# Patient Record
Sex: FEMALE | Race: WHITE | NOT HISPANIC OR LATINO | Employment: PART TIME | URBAN - METROPOLITAN AREA
[De-identification: names, ages, dates, MRNs, and addresses within clinical notes are randomized per-mention and may not be internally consistent; named-entity substitution may affect disease eponyms.]

---

## 2022-04-07 ENCOUNTER — HISTORICAL (OUTPATIENT)
Dept: ADMINISTRATIVE | Facility: HOSPITAL | Age: 52
End: 2022-04-07

## 2022-04-20 ENCOUNTER — HISTORICAL (OUTPATIENT)
Dept: SURGERY | Facility: HOSPITAL | Age: 52
End: 2022-04-20

## 2022-04-20 LAB — CBG: 111 (ref 70–115)

## 2022-04-24 VITALS
WEIGHT: 293 LBS | HEIGHT: 67 IN | DIASTOLIC BLOOD PRESSURE: 80 MMHG | BODY MASS INDEX: 45.99 KG/M2 | SYSTOLIC BLOOD PRESSURE: 200 MMHG

## 2022-05-26 ENCOUNTER — LAB REQUISITION (OUTPATIENT)
Dept: LAB | Facility: HOSPITAL | Age: 52
End: 2022-05-26
Payer: MEDICAID

## 2022-05-26 DIAGNOSIS — E11.9 TYPE 2 DIABETES MELLITUS WITHOUT COMPLICATIONS: ICD-10-CM

## 2022-05-26 DIAGNOSIS — N18.5 CHRONIC KIDNEY DISEASE, STAGE 5: ICD-10-CM

## 2022-05-26 DIAGNOSIS — E78.49 OTHER HYPERLIPIDEMIA: ICD-10-CM

## 2022-05-26 LAB
ALBUMIN SERPL-MCNC: 2.3 GM/DL (ref 3.5–5)
ALBUMIN/GLOB SERPL: 0.7 RATIO (ref 1.1–2)
ALP SERPL-CCNC: 89 UNIT/L (ref 40–150)
ALT SERPL-CCNC: <5 UNIT/L (ref 0–55)
AST SERPL-CCNC: 10 UNIT/L (ref 5–34)
BASOPHILS # BLD AUTO: 0.05 X10(3)/MCL (ref 0–0.2)
BASOPHILS NFR BLD AUTO: 0.8 %
BILIRUBIN DIRECT+TOT PNL SERPL-MCNC: 0.2 MG/DL (ref 0–0.5)
BILIRUBIN DIRECT+TOT PNL SERPL-MCNC: 0.6 MG/DL
BUN SERPL-MCNC: 26 MG/DL (ref 9.8–20.1)
CALCIUM SERPL-MCNC: 8.5 MG/DL (ref 8.4–10.2)
CHLORIDE SERPL-SCNC: 105 MMOL/L (ref 98–107)
CHOLEST SERPL-MCNC: 125 MG/DL
CHOLEST/HDLC SERPL: 4 {RATIO} (ref 0–5)
CO2 SERPL-SCNC: 28 MMOL/L (ref 22–29)
CREAT SERPL-MCNC: 3.4 MG/DL (ref 0.55–1.02)
EOSINOPHIL # BLD AUTO: 0.28 X10(3)/MCL (ref 0–0.9)
EOSINOPHIL NFR BLD AUTO: 4.4 %
ERYTHROCYTE [DISTWIDTH] IN BLOOD BY AUTOMATED COUNT: 14.5 % (ref 11.5–17)
EST. AVERAGE GLUCOSE BLD GHB EST-MCNC: 131.2 MG/DL
GLOBULIN SER-MCNC: 3.2 GM/DL (ref 2.4–3.5)
GLUCOSE SERPL-MCNC: 146 MG/DL (ref 74–100)
HBA1C MFR BLD: 6.2 %
HCT VFR BLD AUTO: 26.6 % (ref 37–47)
HDLC SERPL-MCNC: 29 MG/DL (ref 35–60)
HGB BLD-MCNC: 8.1 GM/DL (ref 12–16)
IMM GRANULOCYTES # BLD AUTO: 0.14 X10(3)/MCL (ref 0–0.02)
IMM GRANULOCYTES NFR BLD AUTO: 2.2 % (ref 0–0.43)
LDLC SERPL CALC-MCNC: 67 MG/DL (ref 50–140)
LYMPHOCYTES # BLD AUTO: 1.76 X10(3)/MCL (ref 0.6–4.6)
LYMPHOCYTES NFR BLD AUTO: 27.9 %
MCH RBC QN AUTO: 29 PG (ref 27–31)
MCHC RBC AUTO-ENTMCNC: 30.5 MG/DL (ref 33–36)
MCV RBC AUTO: 95.3 FL (ref 80–94)
MONOCYTES # BLD AUTO: 0.66 X10(3)/MCL (ref 0.1–1.3)
MONOCYTES NFR BLD AUTO: 10.5 %
NEUTROPHILS # BLD AUTO: 3.4 X10(3)/MCL (ref 2.1–9.2)
NEUTROPHILS NFR BLD AUTO: 54.2 %
NRBC BLD AUTO-RTO: 0 %
PLATELET # BLD AUTO: 262 X10(3)/MCL (ref 130–400)
PMV BLD AUTO: 10.9 FL (ref 9.4–12.4)
POTASSIUM SERPL-SCNC: 4 MMOL/L (ref 3.5–5.1)
PREALB SERPL-MCNC: 14.2 MG/DL (ref 16–38)
PROT SERPL-MCNC: 5.5 GM/DL (ref 6.4–8.3)
RBC # BLD AUTO: 2.79 X10(6)/MCL (ref 4.2–5.4)
SODIUM SERPL-SCNC: 144 MMOL/L (ref 136–145)
TRIGL SERPL-MCNC: 144 MG/DL (ref 37–140)
VIT B12 SERPL-MCNC: 1258 PG/ML (ref 213–816)
VLDLC SERPL CALC-MCNC: 29 MG/DL
WBC # SPEC AUTO: 6.3 X10(3)/MCL (ref 4.5–11.5)

## 2022-05-26 PROCEDURE — 83036 HEMOGLOBIN GLYCOSYLATED A1C: CPT | Performed by: NURSE PRACTITIONER

## 2022-05-26 PROCEDURE — 85025 COMPLETE CBC W/AUTO DIFF WBC: CPT | Performed by: NURSE PRACTITIONER

## 2022-05-26 PROCEDURE — 80061 LIPID PANEL: CPT | Performed by: NURSE PRACTITIONER

## 2022-05-26 PROCEDURE — 82607 VITAMIN B-12: CPT | Performed by: NURSE PRACTITIONER

## 2022-05-26 PROCEDURE — 82248 BILIRUBIN DIRECT: CPT | Performed by: NURSE PRACTITIONER

## 2022-05-26 PROCEDURE — 84134 ASSAY OF PREALBUMIN: CPT | Performed by: NURSE PRACTITIONER

## 2022-05-26 PROCEDURE — 80053 COMPREHEN METABOLIC PANEL: CPT | Performed by: NURSE PRACTITIONER

## 2022-05-26 PROCEDURE — 80177 DRUG SCRN QUAN LEVETIRACETAM: CPT | Performed by: NURSE PRACTITIONER

## 2022-05-27 LAB — LEVETIRACETAM SERPL-MCNC: 28.2 MCG/ML (ref 10–40)

## 2022-06-02 ENCOUNTER — LAB REQUISITION (OUTPATIENT)
Dept: LAB | Facility: HOSPITAL | Age: 52
End: 2022-06-02
Payer: MEDICAID

## 2022-06-02 DIAGNOSIS — E11.9 TYPE 2 DIABETES MELLITUS WITHOUT COMPLICATIONS: ICD-10-CM

## 2022-06-02 DIAGNOSIS — E78.49 OTHER HYPERLIPIDEMIA: ICD-10-CM

## 2022-06-02 LAB
ALBUMIN SERPL-MCNC: 2.7 GM/DL (ref 3.5–5)
ALBUMIN/GLOB SERPL: 0.9 RATIO (ref 1.1–2)
ALP SERPL-CCNC: 130 UNIT/L (ref 40–150)
ALT SERPL-CCNC: 5 UNIT/L (ref 0–55)
AST SERPL-CCNC: 12 UNIT/L (ref 5–34)
BASOPHILS # BLD AUTO: 0.07 X10(3)/MCL (ref 0–0.2)
BASOPHILS NFR BLD AUTO: 1.5 %
BILIRUBIN DIRECT+TOT PNL SERPL-MCNC: 1 MG/DL
BUN SERPL-MCNC: 12.6 MG/DL (ref 9.8–20.1)
CALCIUM SERPL-MCNC: 8.6 MG/DL (ref 8.4–10.2)
CHLORIDE SERPL-SCNC: 97 MMOL/L (ref 98–107)
CO2 SERPL-SCNC: 35 MMOL/L (ref 22–29)
CREAT SERPL-MCNC: 2.77 MG/DL (ref 0.55–1.02)
EOSINOPHIL # BLD AUTO: 0.21 X10(3)/MCL (ref 0–0.9)
EOSINOPHIL NFR BLD AUTO: 4.6 %
ERYTHROCYTE [DISTWIDTH] IN BLOOD BY AUTOMATED COUNT: 14.2 % (ref 11.5–17)
GLOBULIN SER-MCNC: 3.1 GM/DL (ref 2.4–3.5)
GLUCOSE SERPL-MCNC: 135 MG/DL (ref 74–100)
HCT VFR BLD AUTO: 30 % (ref 37–47)
HGB BLD-MCNC: 8.9 GM/DL (ref 12–16)
HYPOCHROMIA BLD QL SMEAR: ABNORMAL
IMM GRANULOCYTES # BLD AUTO: 0.01 X10(3)/MCL (ref 0–0.02)
IMM GRANULOCYTES NFR BLD AUTO: 0.2 % (ref 0–0.43)
LYMPHOCYTES # BLD AUTO: 1.76 X10(3)/MCL (ref 0.6–4.6)
LYMPHOCYTES NFR BLD AUTO: 38.8 %
MCH RBC QN AUTO: 28.7 PG (ref 27–31)
MCHC RBC AUTO-ENTMCNC: 29.7 MG/DL (ref 33–36)
MCV RBC AUTO: 96.8 FL (ref 80–94)
MONOCYTES # BLD AUTO: 0.56 X10(3)/MCL (ref 0.1–1.3)
MONOCYTES NFR BLD AUTO: 12.3 %
NEUTROPHILS # BLD AUTO: 1.9 X10(3)/MCL (ref 2.1–9.2)
NEUTROPHILS NFR BLD AUTO: 42.6 %
NRBC BLD AUTO-RTO: 0 %
PLATELET # BLD AUTO: 151 X10(3)/MCL (ref 130–400)
PLATELET # BLD EST: ADEQUATE 10*3/UL
PMV BLD AUTO: 11.4 FL (ref 9.4–12.4)
POTASSIUM SERPL-SCNC: 4 MMOL/L (ref 3.5–5.1)
PROT SERPL-MCNC: 5.8 GM/DL (ref 6.4–8.3)
RBC # BLD AUTO: 3.1 X10(6)/MCL (ref 4.2–5.4)
RBC MORPH BLD: ABNORMAL
SODIUM SERPL-SCNC: 141 MMOL/L (ref 136–145)
TSH SERPL-ACNC: 4.49 UIU/ML (ref 0.35–4.94)
WBC # SPEC AUTO: 4.5 X10(3)/MCL (ref 4.5–11.5)

## 2022-06-02 PROCEDURE — 84443 ASSAY THYROID STIM HORMONE: CPT | Performed by: NURSE PRACTITIONER

## 2022-06-02 PROCEDURE — 80053 COMPREHEN METABOLIC PANEL: CPT | Performed by: NURSE PRACTITIONER

## 2022-06-02 PROCEDURE — 85025 COMPLETE CBC W/AUTO DIFF WBC: CPT | Performed by: NURSE PRACTITIONER

## 2022-06-09 ENCOUNTER — LAB REQUISITION (OUTPATIENT)
Dept: LAB | Facility: HOSPITAL | Age: 52
End: 2022-06-09
Payer: MEDICAID

## 2022-06-09 DIAGNOSIS — E11.9 TYPE 2 DIABETES MELLITUS WITHOUT COMPLICATIONS: ICD-10-CM

## 2022-06-09 DIAGNOSIS — E78.49 OTHER HYPERLIPIDEMIA: ICD-10-CM

## 2022-06-09 LAB
ALBUMIN SERPL-MCNC: 2.8 GM/DL (ref 3.5–5)
ALBUMIN/GLOB SERPL: 0.9 RATIO (ref 1.1–2)
ALP SERPL-CCNC: 122 UNIT/L (ref 40–150)
ALT SERPL-CCNC: 9 UNIT/L (ref 0–55)
AST SERPL-CCNC: 12 UNIT/L (ref 5–34)
BASOPHILS # BLD AUTO: 0.05 X10(3)/MCL (ref 0–0.2)
BASOPHILS NFR BLD AUTO: 1.1 %
BILIRUBIN DIRECT+TOT PNL SERPL-MCNC: 0.8 MG/DL
BUN SERPL-MCNC: 9.3 MG/DL (ref 9.8–20.1)
CALCIUM SERPL-MCNC: 8.6 MG/DL (ref 8.4–10.2)
CHLORIDE SERPL-SCNC: 98 MMOL/L (ref 98–107)
CO2 SERPL-SCNC: 32 MMOL/L (ref 22–29)
CREAT SERPL-MCNC: 2.32 MG/DL (ref 0.55–1.02)
EOSINOPHIL # BLD AUTO: 0.29 X10(3)/MCL (ref 0–0.9)
EOSINOPHIL NFR BLD AUTO: 6.3 %
ERYTHROCYTE [DISTWIDTH] IN BLOOD BY AUTOMATED COUNT: 14.8 % (ref 11.5–17)
GLOBULIN SER-MCNC: 3 GM/DL (ref 2.4–3.5)
GLUCOSE SERPL-MCNC: 190 MG/DL (ref 74–100)
HCT VFR BLD AUTO: 26.8 % (ref 37–47)
HGB BLD-MCNC: 7.9 GM/DL (ref 12–16)
IMM GRANULOCYTES # BLD AUTO: 0.01 X10(3)/MCL (ref 0–0.02)
IMM GRANULOCYTES NFR BLD AUTO: 0.2 % (ref 0–0.43)
LYMPHOCYTES # BLD AUTO: 1.78 X10(3)/MCL (ref 0.6–4.6)
LYMPHOCYTES NFR BLD AUTO: 38.9 %
MCH RBC QN AUTO: 28.3 PG (ref 27–31)
MCHC RBC AUTO-ENTMCNC: 29.5 MG/DL (ref 33–36)
MCV RBC AUTO: 96.1 FL (ref 80–94)
MONOCYTES # BLD AUTO: 0.4 X10(3)/MCL (ref 0.1–1.3)
MONOCYTES NFR BLD AUTO: 8.7 %
NEUTROPHILS # BLD AUTO: 2.1 X10(3)/MCL (ref 2.1–9.2)
NEUTROPHILS NFR BLD AUTO: 44.8 %
NRBC BLD AUTO-RTO: 0 %
PLATELET # BLD AUTO: 168 X10(3)/MCL (ref 130–400)
PMV BLD AUTO: 11 FL (ref 9.4–12.4)
POTASSIUM SERPL-SCNC: 4.3 MMOL/L (ref 3.5–5.1)
PROT SERPL-MCNC: 5.8 GM/DL (ref 6.4–8.3)
RBC # BLD AUTO: 2.79 X10(6)/MCL (ref 4.2–5.4)
SODIUM SERPL-SCNC: 140 MMOL/L (ref 136–145)
WBC # SPEC AUTO: 4.6 X10(3)/MCL (ref 4.5–11.5)

## 2022-06-09 PROCEDURE — 80053 COMPREHEN METABOLIC PANEL: CPT | Performed by: NURSE PRACTITIONER

## 2022-06-09 PROCEDURE — 85025 COMPLETE CBC W/AUTO DIFF WBC: CPT | Performed by: NURSE PRACTITIONER

## 2022-06-16 ENCOUNTER — LAB REQUISITION (OUTPATIENT)
Dept: LAB | Facility: HOSPITAL | Age: 52
End: 2022-06-16
Payer: MEDICAID

## 2022-06-16 DIAGNOSIS — E78.49 OTHER HYPERLIPIDEMIA: ICD-10-CM

## 2022-06-16 DIAGNOSIS — E11.9 TYPE 2 DIABETES MELLITUS WITHOUT COMPLICATIONS: ICD-10-CM

## 2022-06-16 LAB
ALBUMIN SERPL-MCNC: 2.8 GM/DL (ref 3.5–5)
ALBUMIN/GLOB SERPL: 1.1 RATIO (ref 1.1–2)
ALP SERPL-CCNC: 100 UNIT/L (ref 40–150)
ALT SERPL-CCNC: 6 UNIT/L (ref 0–55)
ANISOCYTOSIS BLD QL SMEAR: ABNORMAL
AST SERPL-CCNC: 11 UNIT/L (ref 5–34)
BASOPHILS # BLD AUTO: 0.03 X10(3)/MCL (ref 0–0.2)
BASOPHILS NFR BLD AUTO: 0.6 %
BILIRUBIN DIRECT+TOT PNL SERPL-MCNC: 0.9 MG/DL
BUN SERPL-MCNC: 6.2 MG/DL (ref 9.8–20.1)
CALCIUM SERPL-MCNC: 8.4 MG/DL (ref 8.4–10.2)
CHLORIDE SERPL-SCNC: 102 MMOL/L (ref 98–107)
CO2 SERPL-SCNC: 30 MMOL/L (ref 22–29)
CREAT SERPL-MCNC: 2.13 MG/DL (ref 0.55–1.02)
EOSINOPHIL # BLD AUTO: 0.19 X10(3)/MCL (ref 0–0.9)
EOSINOPHIL NFR BLD AUTO: 4.1 %
ERYTHROCYTE [DISTWIDTH] IN BLOOD BY AUTOMATED COUNT: 16.4 % (ref 11.5–17)
GLOBULIN SER-MCNC: 2.6 GM/DL (ref 2.4–3.5)
GLUCOSE SERPL-MCNC: 145 MG/DL (ref 74–100)
HCT VFR BLD AUTO: 25.5 % (ref 37–47)
HGB BLD-MCNC: 7.5 GM/DL (ref 12–16)
HYPOCHROMIA BLD QL SMEAR: ABNORMAL
IMM GRANULOCYTES # BLD AUTO: 0.01 X10(3)/MCL (ref 0–0.02)
IMM GRANULOCYTES NFR BLD AUTO: 0.2 % (ref 0–0.43)
LYMPHOCYTES # BLD AUTO: 1.77 X10(3)/MCL (ref 0.6–4.6)
LYMPHOCYTES NFR BLD AUTO: 37.7 %
MACROCYTES BLD QL SMEAR: SLIGHT
MCH RBC QN AUTO: 29.3 PG (ref 27–31)
MCHC RBC AUTO-ENTMCNC: 29.4 MG/DL (ref 33–36)
MCV RBC AUTO: 99.6 FL (ref 80–94)
MICROCYTES BLD QL SMEAR: SLIGHT
MONOCYTES # BLD AUTO: 0.36 X10(3)/MCL (ref 0.1–1.3)
MONOCYTES NFR BLD AUTO: 7.7 %
NEUTROPHILS # BLD AUTO: 2.3 X10(3)/MCL (ref 2.1–9.2)
NEUTROPHILS NFR BLD AUTO: 49.7 %
NRBC BLD AUTO-RTO: 0 %
PLATELET # BLD AUTO: 214 X10(3)/MCL (ref 130–400)
PLATELET # BLD EST: ADEQUATE 10*3/UL
PMV BLD AUTO: 10.7 FL (ref 9.4–12.4)
POTASSIUM SERPL-SCNC: 3.8 MMOL/L (ref 3.5–5.1)
PROT SERPL-MCNC: 5.4 GM/DL (ref 6.4–8.3)
RBC # BLD AUTO: 2.56 X10(6)/MCL (ref 4.2–5.4)
RBC MORPH BLD: ABNORMAL
SODIUM SERPL-SCNC: 142 MMOL/L (ref 136–145)
WBC # SPEC AUTO: 4.7 X10(3)/MCL (ref 4.5–11.5)

## 2022-06-16 PROCEDURE — 80053 COMPREHEN METABOLIC PANEL: CPT | Performed by: NURSE PRACTITIONER

## 2022-06-16 PROCEDURE — 85025 COMPLETE CBC W/AUTO DIFF WBC: CPT | Performed by: NURSE PRACTITIONER

## 2022-06-30 ENCOUNTER — LAB REQUISITION (OUTPATIENT)
Dept: LAB | Facility: HOSPITAL | Age: 52
End: 2022-06-30
Payer: MEDICAID

## 2022-06-30 DIAGNOSIS — D64.9 ANEMIA, UNSPECIFIED: ICD-10-CM

## 2022-06-30 DIAGNOSIS — E56.8 DEFICIENCY OF OTHER VITAMINS: ICD-10-CM

## 2022-06-30 DIAGNOSIS — N18.6 END STAGE RENAL DISEASE: ICD-10-CM

## 2022-06-30 LAB
ALBUMIN SERPL-MCNC: 2.7 GM/DL (ref 3.5–5)
ALBUMIN/GLOB SERPL: 1 RATIO (ref 1.1–2)
ALP SERPL-CCNC: 77 UNIT/L (ref 40–150)
ALT SERPL-CCNC: 6 UNIT/L (ref 0–55)
AST SERPL-CCNC: 8 UNIT/L (ref 5–34)
BASOPHILS # BLD AUTO: 0.03 X10(3)/MCL (ref 0–0.2)
BASOPHILS NFR BLD AUTO: 0.6 %
BILIRUBIN DIRECT+TOT PNL SERPL-MCNC: 0.8 MG/DL
BUN SERPL-MCNC: 10.4 MG/DL (ref 9.8–20.1)
CALCIUM SERPL-MCNC: 8.9 MG/DL (ref 8.4–10.2)
CHLORIDE SERPL-SCNC: 102 MMOL/L (ref 98–107)
CO2 SERPL-SCNC: 29 MMOL/L (ref 22–29)
CREAT SERPL-MCNC: 2.42 MG/DL (ref 0.55–1.02)
DEPRECATED CALCIDIOL+CALCIFEROL SERPL-MC: 10.4 NG/ML (ref 30–80)
EOSINOPHIL # BLD AUTO: 0.15 X10(3)/MCL (ref 0–0.9)
EOSINOPHIL NFR BLD AUTO: 3.1 %
ERYTHROCYTE [DISTWIDTH] IN BLOOD BY AUTOMATED COUNT: 16.9 % (ref 11.5–17)
GLOBULIN SER-MCNC: 2.7 GM/DL (ref 2.4–3.5)
GLUCOSE SERPL-MCNC: 220 MG/DL (ref 74–100)
HCT VFR BLD AUTO: 26.4 % (ref 37–47)
HGB BLD-MCNC: 8.2 GM/DL (ref 12–16)
IMM GRANULOCYTES # BLD AUTO: 0.02 X10(3)/MCL (ref 0–0.02)
IMM GRANULOCYTES NFR BLD AUTO: 0.4 % (ref 0–0.43)
LYMPHOCYTES # BLD AUTO: 1.73 X10(3)/MCL (ref 0.6–4.6)
LYMPHOCYTES NFR BLD AUTO: 35.8 %
MCH RBC QN AUTO: 29.9 PG (ref 27–31)
MCHC RBC AUTO-ENTMCNC: 31.1 MG/DL (ref 33–36)
MCV RBC AUTO: 96.4 FL (ref 80–94)
MONOCYTES # BLD AUTO: 0.37 X10(3)/MCL (ref 0.1–1.3)
MONOCYTES NFR BLD AUTO: 7.7 %
NEUTROPHILS # BLD AUTO: 2.5 X10(3)/MCL (ref 2.1–9.2)
NEUTROPHILS NFR BLD AUTO: 52.4 %
NRBC BLD AUTO-RTO: 0 %
PLATELET # BLD AUTO: 223 X10(3)/MCL (ref 130–400)
PMV BLD AUTO: 10.3 FL (ref 7.4–10.4)
POTASSIUM SERPL-SCNC: 4.3 MMOL/L (ref 3.5–5.1)
PROT SERPL-MCNC: 5.4 GM/DL (ref 6.4–8.3)
RBC # BLD AUTO: 2.74 X10(6)/MCL (ref 4.2–5.4)
SODIUM SERPL-SCNC: 140 MMOL/L (ref 136–145)
WBC # SPEC AUTO: 4.8 X10(3)/MCL (ref 4.5–11.5)

## 2022-06-30 PROCEDURE — 80053 COMPREHEN METABOLIC PANEL: CPT | Performed by: NURSE PRACTITIONER

## 2022-06-30 PROCEDURE — 82306 VITAMIN D 25 HYDROXY: CPT | Performed by: NURSE PRACTITIONER

## 2022-06-30 PROCEDURE — 85025 COMPLETE CBC W/AUTO DIFF WBC: CPT | Performed by: NURSE PRACTITIONER

## 2022-07-18 ENCOUNTER — LAB REQUISITION (OUTPATIENT)
Dept: LAB | Facility: HOSPITAL | Age: 52
End: 2022-07-18
Payer: MEDICAID

## 2022-07-18 DIAGNOSIS — N18.4 CHRONIC KIDNEY DISEASE, STAGE 4 (SEVERE): ICD-10-CM

## 2022-07-18 DIAGNOSIS — D64.9 ANEMIA, UNSPECIFIED: ICD-10-CM

## 2022-07-18 LAB
ALBUMIN SERPL-MCNC: 2.9 GM/DL (ref 3.5–5)
ALBUMIN/GLOB SERPL: 0.9 RATIO (ref 1.1–2)
ALP SERPL-CCNC: 86 UNIT/L (ref 40–150)
ALT SERPL-CCNC: 7 UNIT/L (ref 0–55)
AST SERPL-CCNC: 8 UNIT/L (ref 5–34)
BASOPHILS # BLD AUTO: 0.04 X10(3)/MCL (ref 0–0.2)
BASOPHILS NFR BLD AUTO: 0.7 %
BILIRUBIN DIRECT+TOT PNL SERPL-MCNC: 0.7 MG/DL
BUN SERPL-MCNC: 29.1 MG/DL (ref 9.8–20.1)
CALCIUM SERPL-MCNC: 8.8 MG/DL (ref 8.4–10.2)
CHLORIDE SERPL-SCNC: 104 MMOL/L (ref 98–107)
CO2 SERPL-SCNC: 25 MMOL/L (ref 22–29)
CREAT SERPL-MCNC: 4.34 MG/DL (ref 0.55–1.02)
EOSINOPHIL # BLD AUTO: 0.19 X10(3)/MCL (ref 0–0.9)
EOSINOPHIL NFR BLD AUTO: 3.3 %
ERYTHROCYTE [DISTWIDTH] IN BLOOD BY AUTOMATED COUNT: 15.8 % (ref 11.5–17)
GLOBULIN SER-MCNC: 3.1 GM/DL (ref 2.4–3.5)
GLUCOSE SERPL-MCNC: 166 MG/DL (ref 74–100)
HCT VFR BLD AUTO: 30.8 % (ref 37–47)
HGB BLD-MCNC: 9.3 GM/DL (ref 12–16)
IMM GRANULOCYTES # BLD AUTO: 0.02 X10(3)/MCL (ref 0–0.04)
IMM GRANULOCYTES NFR BLD AUTO: 0.3 %
LYMPHOCYTES # BLD AUTO: 2.27 X10(3)/MCL (ref 0.6–4.6)
LYMPHOCYTES NFR BLD AUTO: 39.7 %
MCH RBC QN AUTO: 29.9 PG (ref 27–31)
MCHC RBC AUTO-ENTMCNC: 30.2 MG/DL (ref 33–36)
MCV RBC AUTO: 99 FL (ref 80–94)
MONOCYTES # BLD AUTO: 0.39 X10(3)/MCL (ref 0.1–1.3)
MONOCYTES NFR BLD AUTO: 6.8 %
NEUTROPHILS # BLD AUTO: 2.8 X10(3)/MCL (ref 2.1–9.2)
NEUTROPHILS NFR BLD AUTO: 49.2 %
NRBC BLD AUTO-RTO: 0 %
PLATELET # BLD AUTO: 222 X10(3)/MCL (ref 130–400)
PMV BLD AUTO: 10.4 FL (ref 7.4–10.4)
POTASSIUM SERPL-SCNC: 4.5 MMOL/L (ref 3.5–5.1)
PROT SERPL-MCNC: 6 GM/DL (ref 6.4–8.3)
RBC # BLD AUTO: 3.11 X10(6)/MCL (ref 4.2–5.4)
SODIUM SERPL-SCNC: 142 MMOL/L (ref 136–145)
WBC # SPEC AUTO: 5.7 X10(3)/MCL (ref 4.5–11.5)

## 2022-07-18 PROCEDURE — 80053 COMPREHEN METABOLIC PANEL: CPT | Performed by: NURSE PRACTITIONER

## 2022-07-18 PROCEDURE — 85025 COMPLETE CBC W/AUTO DIFF WBC: CPT | Performed by: NURSE PRACTITIONER

## 2022-08-03 ENCOUNTER — LAB REQUISITION (OUTPATIENT)
Dept: LAB | Facility: HOSPITAL | Age: 52
End: 2022-08-03
Payer: MEDICAID

## 2022-08-03 DIAGNOSIS — A49.9 BACTERIAL INFECTION, UNSPECIFIED: ICD-10-CM

## 2022-08-03 LAB
ALBUMIN SERPL-MCNC: 2.9 GM/DL (ref 3.5–5)
ALBUMIN/GLOB SERPL: 1 RATIO (ref 1.1–2)
ALP SERPL-CCNC: 87 UNIT/L (ref 40–150)
ALT SERPL-CCNC: 7 UNIT/L (ref 0–55)
ANISOCYTOSIS BLD QL SMEAR: ABNORMAL
AST SERPL-CCNC: 10 UNIT/L (ref 5–34)
BASOPHILS # BLD AUTO: 0.03 X10(3)/MCL (ref 0–0.2)
BASOPHILS NFR BLD AUTO: 0.6 %
BILIRUBIN DIRECT+TOT PNL SERPL-MCNC: 0.8 MG/DL
BUN SERPL-MCNC: 25.6 MG/DL (ref 9.8–20.1)
CALCIUM SERPL-MCNC: 8.9 MG/DL (ref 8.4–10.2)
CHLORIDE SERPL-SCNC: 103 MMOL/L (ref 98–107)
CO2 SERPL-SCNC: 27 MMOL/L (ref 22–29)
CREAT SERPL-MCNC: 3.38 MG/DL (ref 0.55–1.02)
CRP SERPL HS-MCNC: 12 MG/L
EOSINOPHIL # BLD AUTO: 0.18 X10(3)/MCL (ref 0–0.9)
EOSINOPHIL NFR BLD AUTO: 3.4 %
ERYTHROCYTE [DISTWIDTH] IN BLOOD BY AUTOMATED COUNT: 15.5 % (ref 11.5–17)
ERYTHROCYTE [SEDIMENTATION RATE] IN BLOOD: 54 MM/HR (ref 0–20)
GLOBULIN SER-MCNC: 3 GM/DL (ref 2.4–3.5)
GLUCOSE SERPL-MCNC: 96 MG/DL (ref 74–100)
HCT VFR BLD AUTO: 31.7 % (ref 37–47)
HGB BLD-MCNC: 9.4 GM/DL (ref 12–16)
HYPOCHROMIA BLD QL SMEAR: ABNORMAL
IMM GRANULOCYTES # BLD AUTO: 0.01 X10(3)/MCL (ref 0–0.04)
IMM GRANULOCYTES NFR BLD AUTO: 0.2 %
LYMPHOCYTES # BLD AUTO: 2.34 X10(3)/MCL (ref 0.6–4.6)
LYMPHOCYTES NFR BLD AUTO: 43.8 %
MACROCYTES BLD QL SMEAR: ABNORMAL
MCH RBC QN AUTO: 29.7 PG (ref 27–31)
MCHC RBC AUTO-ENTMCNC: 29.7 MG/DL (ref 33–36)
MCV RBC AUTO: 100 FL (ref 80–94)
MONOCYTES # BLD AUTO: 0.5 X10(3)/MCL (ref 0.1–1.3)
MONOCYTES NFR BLD AUTO: 9.4 %
NEUTROPHILS # BLD AUTO: 2.3 X10(3)/MCL (ref 2.1–9.2)
NEUTROPHILS NFR BLD AUTO: 42.6 %
NRBC BLD AUTO-RTO: 0 %
PLATELET # BLD AUTO: 199 X10(3)/MCL (ref 130–400)
PLATELET # BLD EST: ADEQUATE 10*3/UL
PMV BLD AUTO: 10.4 FL (ref 7.4–10.4)
POTASSIUM SERPL-SCNC: 4.5 MMOL/L (ref 3.5–5.1)
PREALB SERPL-MCNC: 18.8 MG/DL (ref 16–38)
PROT SERPL-MCNC: 5.9 GM/DL (ref 6.4–8.3)
RBC # BLD AUTO: 3.17 X10(6)/MCL (ref 4.2–5.4)
RBC MORPH BLD: ABNORMAL
SODIUM SERPL-SCNC: 140 MMOL/L (ref 136–145)
WBC # SPEC AUTO: 5.3 X10(3)/MCL (ref 4.5–11.5)

## 2022-08-03 PROCEDURE — 84134 ASSAY OF PREALBUMIN: CPT | Performed by: FAMILY MEDICINE

## 2022-08-03 PROCEDURE — 80053 COMPREHEN METABOLIC PANEL: CPT | Performed by: FAMILY MEDICINE

## 2022-08-03 PROCEDURE — 85651 RBC SED RATE NONAUTOMATED: CPT | Performed by: FAMILY MEDICINE

## 2022-08-03 PROCEDURE — 85025 COMPLETE CBC W/AUTO DIFF WBC: CPT | Performed by: FAMILY MEDICINE

## 2022-08-03 PROCEDURE — 86141 C-REACTIVE PROTEIN HS: CPT | Performed by: FAMILY MEDICINE

## 2022-08-15 ENCOUNTER — LAB REQUISITION (OUTPATIENT)
Dept: LAB | Facility: HOSPITAL | Age: 52
End: 2022-08-15
Payer: MEDICAID

## 2022-08-15 DIAGNOSIS — N18.4 CHRONIC KIDNEY DISEASE, STAGE 4 (SEVERE): ICD-10-CM

## 2022-08-15 LAB
ALBUMIN SERPL-MCNC: 2.8 GM/DL (ref 3.5–5)
ALBUMIN/GLOB SERPL: 1 RATIO (ref 1.1–2)
ALP SERPL-CCNC: 83 UNIT/L (ref 40–150)
ALT SERPL-CCNC: 9 UNIT/L (ref 0–55)
ANISOCYTOSIS BLD QL SMEAR: ABNORMAL
AST SERPL-CCNC: 14 UNIT/L (ref 5–34)
BASOPHILS # BLD AUTO: 0.04 X10(3)/MCL (ref 0–0.2)
BASOPHILS NFR BLD AUTO: 0.7 %
BILIRUBIN DIRECT+TOT PNL SERPL-MCNC: 0.5 MG/DL
BUN SERPL-MCNC: 30 MG/DL (ref 9.8–20.1)
CALCIUM SERPL-MCNC: 8.9 MG/DL (ref 8.4–10.2)
CHLORIDE SERPL-SCNC: 103 MMOL/L (ref 98–107)
CO2 SERPL-SCNC: 27 MMOL/L (ref 22–29)
CREAT SERPL-MCNC: 3.65 MG/DL (ref 0.55–1.02)
EOSINOPHIL # BLD AUTO: 0.15 X10(3)/MCL (ref 0–0.9)
EOSINOPHIL NFR BLD AUTO: 2.6 %
ERYTHROCYTE [DISTWIDTH] IN BLOOD BY AUTOMATED COUNT: 15.9 % (ref 11.5–17)
GFR SERPLBLD CREATININE-BSD FMLA CKD-EPI: 14 MLS/MIN/1.73/M2
GLOBULIN SER-MCNC: 2.9 GM/DL (ref 2.4–3.5)
GLUCOSE SERPL-MCNC: 88 MG/DL (ref 74–100)
HCT VFR BLD AUTO: 35.3 % (ref 37–47)
HGB BLD-MCNC: 10.4 GM/DL (ref 12–16)
HYPOCHROMIA BLD QL SMEAR: ABNORMAL
IMM GRANULOCYTES # BLD AUTO: 0.01 X10(3)/MCL (ref 0–0.04)
IMM GRANULOCYTES NFR BLD AUTO: 0.2 %
LYMPHOCYTES # BLD AUTO: 2.28 X10(3)/MCL (ref 0.6–4.6)
LYMPHOCYTES NFR BLD AUTO: 40.1 %
MACROCYTES BLD QL SMEAR: ABNORMAL
MCH RBC QN AUTO: 29.8 PG (ref 27–31)
MCHC RBC AUTO-ENTMCNC: 29.5 MG/DL (ref 33–36)
MCV RBC AUTO: 101.1 FL (ref 80–94)
MONOCYTES # BLD AUTO: 0.47 X10(3)/MCL (ref 0.1–1.3)
MONOCYTES NFR BLD AUTO: 8.3 %
NEUTROPHILS # BLD AUTO: 2.7 X10(3)/MCL (ref 2.1–9.2)
NEUTROPHILS NFR BLD AUTO: 48.1 %
NRBC BLD AUTO-RTO: 0 %
PLATELET # BLD AUTO: 214 X10(3)/MCL (ref 130–400)
PLATELET # BLD EST: ADEQUATE 10*3/UL
PMV BLD AUTO: 10.6 FL (ref 7.4–10.4)
POTASSIUM SERPL-SCNC: 5 MMOL/L (ref 3.5–5.1)
PROT SERPL-MCNC: 5.7 GM/DL (ref 6.4–8.3)
RBC # BLD AUTO: 3.49 X10(6)/MCL (ref 4.2–5.4)
RBC MORPH BLD: ABNORMAL
SODIUM SERPL-SCNC: 141 MMOL/L (ref 136–145)
WBC # SPEC AUTO: 5.7 X10(3)/MCL (ref 4.5–11.5)

## 2022-08-15 PROCEDURE — 85025 COMPLETE CBC W/AUTO DIFF WBC: CPT | Performed by: NURSE PRACTITIONER

## 2022-08-15 PROCEDURE — 80053 COMPREHEN METABOLIC PANEL: CPT | Performed by: NURSE PRACTITIONER

## 2022-08-22 ENCOUNTER — LAB REQUISITION (OUTPATIENT)
Dept: LAB | Facility: HOSPITAL | Age: 52
End: 2022-08-22
Payer: MEDICAID

## 2022-08-22 DIAGNOSIS — N18.4 CHRONIC KIDNEY DISEASE, STAGE 4 (SEVERE): ICD-10-CM

## 2022-08-22 LAB
ALBUMIN SERPL-MCNC: 3 GM/DL (ref 3.5–5)
ALBUMIN/GLOB SERPL: 0.9 RATIO (ref 1.1–2)
ALP SERPL-CCNC: 86 UNIT/L (ref 40–150)
ALT SERPL-CCNC: 11 UNIT/L (ref 0–55)
AST SERPL-CCNC: 15 UNIT/L (ref 5–34)
BASOPHILS # BLD AUTO: 0.04 X10(3)/MCL (ref 0–0.2)
BASOPHILS NFR BLD AUTO: 0.8 %
BILIRUBIN DIRECT+TOT PNL SERPL-MCNC: 0.6 MG/DL
BUN SERPL-MCNC: 22.6 MG/DL (ref 9.8–20.1)
CALCIUM SERPL-MCNC: 8.9 MG/DL (ref 8.4–10.2)
CHLORIDE SERPL-SCNC: 105 MMOL/L (ref 98–107)
CO2 SERPL-SCNC: 24 MMOL/L (ref 22–29)
CREAT SERPL-MCNC: 3.59 MG/DL (ref 0.55–1.02)
EOSINOPHIL # BLD AUTO: 0.19 X10(3)/MCL (ref 0–0.9)
EOSINOPHIL NFR BLD AUTO: 3.7 %
ERYTHROCYTE [DISTWIDTH] IN BLOOD BY AUTOMATED COUNT: 15.5 % (ref 11.5–17)
GFR SERPLBLD CREATININE-BSD FMLA CKD-EPI: 15 MLS/MIN/1.73/M2
GLOBULIN SER-MCNC: 3.3 GM/DL (ref 2.4–3.5)
GLUCOSE SERPL-MCNC: 145 MG/DL (ref 74–100)
HCT VFR BLD AUTO: 36 % (ref 37–47)
HGB BLD-MCNC: 10.8 GM/DL (ref 12–16)
IMM GRANULOCYTES # BLD AUTO: 0.02 X10(3)/MCL (ref 0–0.04)
IMM GRANULOCYTES NFR BLD AUTO: 0.4 %
LYMPHOCYTES # BLD AUTO: 2.38 X10(3)/MCL (ref 0.6–4.6)
LYMPHOCYTES NFR BLD AUTO: 46.6 %
MCH RBC QN AUTO: 29.8 PG (ref 27–31)
MCHC RBC AUTO-ENTMCNC: 30 MG/DL (ref 33–36)
MCV RBC AUTO: 99.4 FL (ref 80–94)
MONOCYTES # BLD AUTO: 0.35 X10(3)/MCL (ref 0.1–1.3)
MONOCYTES NFR BLD AUTO: 6.8 %
NEUTROPHILS # BLD AUTO: 2.1 X10(3)/MCL (ref 2.1–9.2)
NEUTROPHILS NFR BLD AUTO: 41.7 %
NRBC BLD AUTO-RTO: 0 %
PLATELET # BLD AUTO: 139 X10(3)/MCL (ref 130–400)
PMV BLD AUTO: 10.2 FL (ref 7.4–10.4)
POTASSIUM SERPL-SCNC: 4.6 MMOL/L (ref 3.5–5.1)
PROT SERPL-MCNC: 6.3 GM/DL (ref 6.4–8.3)
RBC # BLD AUTO: 3.62 X10(6)/MCL (ref 4.2–5.4)
SODIUM SERPL-SCNC: 142 MMOL/L (ref 136–145)
WBC # SPEC AUTO: 5.1 X10(3)/MCL (ref 4.5–11.5)

## 2022-08-22 PROCEDURE — 85025 COMPLETE CBC W/AUTO DIFF WBC: CPT | Performed by: NURSE PRACTITIONER

## 2022-08-22 PROCEDURE — 80053 COMPREHEN METABOLIC PANEL: CPT | Performed by: NURSE PRACTITIONER

## 2023-06-22 DIAGNOSIS — M54.16 LUMBAR RADICULOPATHY: ICD-10-CM

## 2023-06-22 DIAGNOSIS — M47.816 LUMBAR SPONDYLOSIS: Primary | ICD-10-CM

## 2023-07-03 ENCOUNTER — HOSPITAL ENCOUNTER (OUTPATIENT)
Dept: RADIOLOGY | Facility: HOSPITAL | Age: 53
Discharge: HOME | End: 2023-07-03
Attending: PAIN MEDICINE
Payer: MEDICARE

## 2023-07-03 DIAGNOSIS — M47.816 LUMBAR SPONDYLOSIS: ICD-10-CM

## 2023-07-03 DIAGNOSIS — M54.16 LUMBAR RADICULOPATHY: ICD-10-CM

## 2023-07-03 PROCEDURE — 72148 MRI LUMBAR SPINE W/O DYE: CPT | Mod: TC

## 2023-09-29 ENCOUNTER — HOSPITAL ENCOUNTER (INPATIENT)
Facility: HOSPITAL | Age: 53
LOS: 3 days | Discharge: NURSING, REHAB OR LONG-TERM CARE | DRG: 252 | End: 2023-10-02
Attending: INTERNAL MEDICINE | Admitting: INTERNAL MEDICINE
Payer: MEDICARE

## 2023-09-29 DIAGNOSIS — T82.591A: ICD-10-CM

## 2023-09-29 DIAGNOSIS — T82.868A THROMBOSIS OF KIDNEY DIALYSIS ARTERIOVENOUS GRAFT, INITIAL ENCOUNTER: Primary | ICD-10-CM

## 2023-09-29 DIAGNOSIS — R07.9 CHEST PAIN: ICD-10-CM

## 2023-09-29 DIAGNOSIS — N18.6 ESRD (END STAGE RENAL DISEASE) ON DIALYSIS: ICD-10-CM

## 2023-09-29 DIAGNOSIS — Z99.2 ESRD (END STAGE RENAL DISEASE) ON DIALYSIS: ICD-10-CM

## 2023-09-29 LAB
ALBUMIN SERPL-MCNC: 2.1 G/DL (ref 3.5–5)
ALBUMIN/GLOB SERPL: 0.7 RATIO (ref 1.1–2)
ALP SERPL-CCNC: 94 UNIT/L (ref 40–150)
ALT SERPL-CCNC: 10 UNIT/L (ref 0–55)
AST SERPL-CCNC: 11 UNIT/L (ref 5–34)
BASOPHILS # BLD AUTO: 0.03 X10(3)/MCL
BASOPHILS NFR BLD AUTO: 0.3 %
BILIRUB SERPL-MCNC: 0.4 MG/DL
BUN SERPL-MCNC: 49 MG/DL (ref 9.8–20.1)
CALCIUM SERPL-MCNC: 8 MG/DL (ref 8.4–10.2)
CHLORIDE SERPL-SCNC: 101 MMOL/L (ref 98–107)
CO2 SERPL-SCNC: 22 MMOL/L (ref 22–29)
CREAT SERPL-MCNC: 7.5 MG/DL (ref 0.55–1.02)
EOSINOPHIL # BLD AUTO: 0.2 X10(3)/MCL (ref 0–0.9)
EOSINOPHIL NFR BLD AUTO: 1.8 %
ERYTHROCYTE [DISTWIDTH] IN BLOOD BY AUTOMATED COUNT: 14.7 % (ref 11.5–17)
GFR SERPLBLD CREATININE-BSD FMLA CKD-EPI: 6 MLS/MIN/1.73/M2
GLOBULIN SER-MCNC: 3.2 GM/DL (ref 2.4–3.5)
GLUCOSE SERPL-MCNC: 306 MG/DL (ref 74–100)
HBV SURFACE AG SERPL QL IA: NONREACTIVE
HCT VFR BLD AUTO: 27.1 % (ref 37–47)
HGB BLD-MCNC: 8.2 G/DL (ref 12–16)
IMM GRANULOCYTES # BLD AUTO: 0.37 X10(3)/MCL (ref 0–0.04)
IMM GRANULOCYTES NFR BLD AUTO: 3.4 %
LYMPHOCYTES # BLD AUTO: 1.97 X10(3)/MCL (ref 0.6–4.6)
LYMPHOCYTES NFR BLD AUTO: 17.9 %
MAGNESIUM SERPL-MCNC: 2.2 MG/DL (ref 1.6–2.6)
MCH RBC QN AUTO: 30 PG (ref 27–31)
MCHC RBC AUTO-ENTMCNC: 30.3 G/DL (ref 33–36)
MCV RBC AUTO: 99.3 FL (ref 80–94)
MONOCYTES # BLD AUTO: 0.73 X10(3)/MCL (ref 0.1–1.3)
MONOCYTES NFR BLD AUTO: 6.6 %
NEUTROPHILS # BLD AUTO: 7.72 X10(3)/MCL (ref 2.1–9.2)
NEUTROPHILS NFR BLD AUTO: 70 %
NRBC BLD AUTO-RTO: 0 %
PHOSPHATE SERPL-MCNC: 3.6 MG/DL (ref 2.3–4.7)
PLATELET # BLD AUTO: 249 X10(3)/MCL (ref 130–400)
PMV BLD AUTO: 9.9 FL (ref 7.4–10.4)
POCT GLUCOSE: 162 MG/DL (ref 70–110)
POCT GLUCOSE: 181 MG/DL (ref 70–110)
POCT GLUCOSE: 237 MG/DL (ref 70–110)
POCT GLUCOSE: 329 MG/DL (ref 70–110)
POTASSIUM SERPL-SCNC: 3.6 MMOL/L (ref 3.5–5.1)
PROT SERPL-MCNC: 5.3 GM/DL (ref 6.4–8.3)
RBC # BLD AUTO: 2.73 X10(6)/MCL (ref 4.2–5.4)
SODIUM SERPL-SCNC: 135 MMOL/L (ref 136–145)
WBC # SPEC AUTO: 11.02 X10(3)/MCL (ref 4.5–11.5)

## 2023-09-29 PROCEDURE — 99223 1ST HOSP IP/OBS HIGH 75: CPT | Mod: 25,,, | Performed by: STUDENT IN AN ORGANIZED HEALTH CARE EDUCATION/TRAINING PROGRAM

## 2023-09-29 PROCEDURE — 25000003 PHARM REV CODE 250: Performed by: STUDENT IN AN ORGANIZED HEALTH CARE EDUCATION/TRAINING PROGRAM

## 2023-09-29 PROCEDURE — 36905 PR MECH THROMBECTOMY/INFUS, DIALYSIS CIRCUIT W/ TRANSLML BALLOON ANGIO: ICD-10-PCS | Mod: LT,,, | Performed by: STUDENT IN AN ORGANIZED HEALTH CARE EDUCATION/TRAINING PROGRAM

## 2023-09-29 PROCEDURE — 90935 HEMODIALYSIS ONE EVALUATION: CPT

## 2023-09-29 PROCEDURE — 83735 ASSAY OF MAGNESIUM: CPT | Performed by: NURSE PRACTITIONER

## 2023-09-29 PROCEDURE — 63600175 PHARM REV CODE 636 W HCPCS: Performed by: NURSE PRACTITIONER

## 2023-09-29 PROCEDURE — 84100 ASSAY OF PHOSPHORUS: CPT | Performed by: NURSE PRACTITIONER

## 2023-09-29 PROCEDURE — 87340 HEPATITIS B SURFACE AG IA: CPT | Performed by: STUDENT IN AN ORGANIZED HEALTH CARE EDUCATION/TRAINING PROGRAM

## 2023-09-29 PROCEDURE — 25000003 PHARM REV CODE 250: Performed by: INTERNAL MEDICINE

## 2023-09-29 PROCEDURE — C1725 CATH, TRANSLUMIN NON-LASER: HCPCS | Performed by: STUDENT IN AN ORGANIZED HEALTH CARE EDUCATION/TRAINING PROGRAM

## 2023-09-29 PROCEDURE — 86803 HEPATITIS C AB TEST: CPT | Performed by: STUDENT IN AN ORGANIZED HEALTH CARE EDUCATION/TRAINING PROGRAM

## 2023-09-29 PROCEDURE — 99152 MOD SED SAME PHYS/QHP 5/>YRS: CPT | Mod: ,,, | Performed by: STUDENT IN AN ORGANIZED HEALTH CARE EDUCATION/TRAINING PROGRAM

## 2023-09-29 PROCEDURE — 25500020 PHARM REV CODE 255: Performed by: STUDENT IN AN ORGANIZED HEALTH CARE EDUCATION/TRAINING PROGRAM

## 2023-09-29 PROCEDURE — 99152 MOD SED SAME PHYS/QHP 5/>YRS: CPT | Performed by: STUDENT IN AN ORGANIZED HEALTH CARE EDUCATION/TRAINING PROGRAM

## 2023-09-29 PROCEDURE — 36905 THRMBC/NFS DIALYSIS CIRCUIT: CPT | Mod: LT | Performed by: STUDENT IN AN ORGANIZED HEALTH CARE EDUCATION/TRAINING PROGRAM

## 2023-09-29 PROCEDURE — 63600175 PHARM REV CODE 636 W HCPCS: Performed by: INTERNAL MEDICINE

## 2023-09-29 PROCEDURE — 99152 PR MOD CONSCIOUS SEDATION, SAME PHYS, 5+ YRS, FIRST 15 MIN: ICD-10-PCS | Mod: ,,, | Performed by: STUDENT IN AN ORGANIZED HEALTH CARE EDUCATION/TRAINING PROGRAM

## 2023-09-29 PROCEDURE — C1769 GUIDE WIRE: HCPCS | Performed by: STUDENT IN AN ORGANIZED HEALTH CARE EDUCATION/TRAINING PROGRAM

## 2023-09-29 PROCEDURE — 11000001 HC ACUTE MED/SURG PRIVATE ROOM

## 2023-09-29 PROCEDURE — 36905 THRMBC/NFS DIALYSIS CIRCUIT: CPT | Mod: LT,,, | Performed by: STUDENT IN AN ORGANIZED HEALTH CARE EDUCATION/TRAINING PROGRAM

## 2023-09-29 PROCEDURE — C1726 CATH, BAL DIL, NON-VASCULAR: HCPCS | Performed by: STUDENT IN AN ORGANIZED HEALTH CARE EDUCATION/TRAINING PROGRAM

## 2023-09-29 PROCEDURE — 63600175 PHARM REV CODE 636 W HCPCS: Performed by: STUDENT IN AN ORGANIZED HEALTH CARE EDUCATION/TRAINING PROGRAM

## 2023-09-29 PROCEDURE — 85025 COMPLETE CBC W/AUTO DIFF WBC: CPT | Performed by: NURSE PRACTITIONER

## 2023-09-29 PROCEDURE — C1757 CATH, THROMBECTOMY/EMBOLECT: HCPCS | Performed by: STUDENT IN AN ORGANIZED HEALTH CARE EDUCATION/TRAINING PROGRAM

## 2023-09-29 PROCEDURE — 99153 MOD SED SAME PHYS/QHP EA: CPT | Performed by: STUDENT IN AN ORGANIZED HEALTH CARE EDUCATION/TRAINING PROGRAM

## 2023-09-29 PROCEDURE — 99223 PR INITIAL HOSPITAL CARE,LEVL III: ICD-10-PCS | Mod: 25,,, | Performed by: STUDENT IN AN ORGANIZED HEALTH CARE EDUCATION/TRAINING PROGRAM

## 2023-09-29 PROCEDURE — 86706 HEP B SURFACE ANTIBODY: CPT | Performed by: STUDENT IN AN ORGANIZED HEALTH CARE EDUCATION/TRAINING PROGRAM

## 2023-09-29 PROCEDURE — 80053 COMPREHEN METABOLIC PANEL: CPT | Performed by: NURSE PRACTITIONER

## 2023-09-29 PROCEDURE — 27201423 OPTIME MED/SURG SUP & DEVICES STERILE SUPPLY: Performed by: STUDENT IN AN ORGANIZED HEALTH CARE EDUCATION/TRAINING PROGRAM

## 2023-09-29 PROCEDURE — C1894 INTRO/SHEATH, NON-LASER: HCPCS | Performed by: STUDENT IN AN ORGANIZED HEALTH CARE EDUCATION/TRAINING PROGRAM

## 2023-09-29 RX ORDER — ASPIRIN 81 MG/1
81 TABLET ORAL DAILY
Status: ON HOLD | COMMUNITY
End: 2023-10-02 | Stop reason: HOSPADM

## 2023-09-29 RX ORDER — PANTOPRAZOLE SODIUM 40 MG/1
40 TABLET, DELAYED RELEASE ORAL DAILY
Status: DISCONTINUED | OUTPATIENT
Start: 2023-09-29 | End: 2023-10-02 | Stop reason: HOSPADM

## 2023-09-29 RX ORDER — FENTANYL CITRATE 50 UG/ML
INJECTION, SOLUTION INTRAMUSCULAR; INTRAVENOUS
Status: DISCONTINUED | OUTPATIENT
Start: 2023-09-29 | End: 2023-09-29 | Stop reason: HOSPADM

## 2023-09-29 RX ORDER — ATORVASTATIN CALCIUM 10 MG/1
20 TABLET, FILM COATED ORAL DAILY
Status: DISCONTINUED | OUTPATIENT
Start: 2023-09-29 | End: 2023-10-02 | Stop reason: HOSPADM

## 2023-09-29 RX ORDER — ACETAMINOPHEN 325 MG/1
650 TABLET ORAL EVERY 6 HOURS PRN
Status: DISCONTINUED | OUTPATIENT
Start: 2023-09-29 | End: 2023-10-02 | Stop reason: HOSPADM

## 2023-09-29 RX ORDER — ASPIRIN AND DIPYRIDAMOLE 25; 200 MG/1; MG/1
2 CAPSULE, EXTENDED RELEASE ORAL DAILY
Status: DISCONTINUED | OUTPATIENT
Start: 2023-09-29 | End: 2023-09-30

## 2023-09-29 RX ORDER — AMOXICILLIN 250 MG
2 CAPSULE ORAL 2 TIMES DAILY
Status: DISCONTINUED | OUTPATIENT
Start: 2023-09-29 | End: 2023-10-02 | Stop reason: HOSPADM

## 2023-09-29 RX ORDER — NALOXONE HCL 0.4 MG/ML
0.02 VIAL (ML) INJECTION
Status: DISCONTINUED | OUTPATIENT
Start: 2023-09-29 | End: 2023-10-02 | Stop reason: HOSPADM

## 2023-09-29 RX ORDER — LEVETIRACETAM 500 MG/1
500 TABLET ORAL DAILY
Status: ON HOLD | COMMUNITY
End: 2023-10-02 | Stop reason: SDUPTHER

## 2023-09-29 RX ORDER — AMLODIPINE BESYLATE 5 MG/1
5 TABLET ORAL DAILY
COMMUNITY

## 2023-09-29 RX ORDER — PROCHLORPERAZINE EDISYLATE 5 MG/ML
5 INJECTION INTRAMUSCULAR; INTRAVENOUS EVERY 6 HOURS PRN
Status: DISCONTINUED | OUTPATIENT
Start: 2023-09-29 | End: 2023-10-02 | Stop reason: HOSPADM

## 2023-09-29 RX ORDER — CYCLOBENZAPRINE HCL 10 MG
10 TABLET ORAL 3 TIMES DAILY PRN
Status: DISCONTINUED | OUTPATIENT
Start: 2023-09-29 | End: 2023-10-02 | Stop reason: HOSPADM

## 2023-09-29 RX ORDER — DOCUSATE SODIUM 100 MG/1
100 CAPSULE, LIQUID FILLED ORAL 2 TIMES DAILY
COMMUNITY

## 2023-09-29 RX ORDER — LIDOCAINE HYDROCHLORIDE 10 MG/ML
INJECTION INFILTRATION; PERINEURAL
Status: DISCONTINUED | OUTPATIENT
Start: 2023-09-29 | End: 2023-09-29 | Stop reason: HOSPADM

## 2023-09-29 RX ORDER — HEPARIN SODIUM 1000 [USP'U]/ML
INJECTION, SOLUTION INTRAVENOUS; SUBCUTANEOUS
Status: DISCONTINUED | OUTPATIENT
Start: 2023-09-29 | End: 2023-09-29 | Stop reason: HOSPADM

## 2023-09-29 RX ORDER — LEVETIRACETAM 500 MG/1
500 TABLET ORAL DAILY
Status: DISCONTINUED | OUTPATIENT
Start: 2023-09-29 | End: 2023-10-02

## 2023-09-29 RX ORDER — PANTOPRAZOLE SODIUM 40 MG/1
40 TABLET, DELAYED RELEASE ORAL DAILY
COMMUNITY

## 2023-09-29 RX ORDER — POLYETHYLENE GLYCOL 3350 17 G/17G
17 POWDER, FOR SOLUTION ORAL 2 TIMES DAILY PRN
Status: DISCONTINUED | OUTPATIENT
Start: 2023-09-29 | End: 2023-10-02 | Stop reason: HOSPADM

## 2023-09-29 RX ORDER — GLUCAGON 1 MG
1 KIT INJECTION
Status: DISCONTINUED | OUTPATIENT
Start: 2023-09-29 | End: 2023-10-02 | Stop reason: HOSPADM

## 2023-09-29 RX ORDER — INSULIN ASPART 100 [IU]/ML
0-10 INJECTION, SOLUTION INTRAVENOUS; SUBCUTANEOUS EVERY 6 HOURS PRN
Status: DISCONTINUED | OUTPATIENT
Start: 2023-09-29 | End: 2023-10-02 | Stop reason: HOSPADM

## 2023-09-29 RX ORDER — METOPROLOL SUCCINATE 50 MG/1
50 TABLET, EXTENDED RELEASE ORAL DAILY
COMMUNITY

## 2023-09-29 RX ORDER — CYCLOBENZAPRINE HCL 10 MG
10 TABLET ORAL 3 TIMES DAILY PRN
COMMUNITY

## 2023-09-29 RX ORDER — AMLODIPINE BESYLATE 5 MG/1
5 TABLET ORAL DAILY
Status: DISCONTINUED | OUTPATIENT
Start: 2023-09-29 | End: 2023-10-02 | Stop reason: HOSPADM

## 2023-09-29 RX ORDER — SIMETHICONE 80 MG
1 TABLET,CHEWABLE ORAL 4 TIMES DAILY PRN
Status: DISCONTINUED | OUTPATIENT
Start: 2023-09-29 | End: 2023-10-02 | Stop reason: HOSPADM

## 2023-09-29 RX ORDER — ASPIRIN AND DIPYRIDAMOLE 25; 200 MG/1; MG/1
2 CAPSULE, EXTENDED RELEASE ORAL DAILY
Status: ON HOLD | COMMUNITY
End: 2023-10-02 | Stop reason: SDUPTHER

## 2023-09-29 RX ORDER — MUPIROCIN 20 MG/G
OINTMENT TOPICAL 2 TIMES DAILY
Status: DISCONTINUED | OUTPATIENT
Start: 2023-09-29 | End: 2023-10-02 | Stop reason: HOSPADM

## 2023-09-29 RX ORDER — TALC
6 POWDER (GRAM) TOPICAL NIGHTLY PRN
Status: DISCONTINUED | OUTPATIENT
Start: 2023-09-29 | End: 2023-10-02 | Stop reason: HOSPADM

## 2023-09-29 RX ORDER — INSULIN GLARGINE 100 [IU]/ML
20 INJECTION, SOLUTION SUBCUTANEOUS NIGHTLY
COMMUNITY

## 2023-09-29 RX ORDER — ROSUVASTATIN CALCIUM 40 MG/1
10 TABLET, COATED ORAL NIGHTLY
COMMUNITY

## 2023-09-29 RX ORDER — MIDAZOLAM HYDROCHLORIDE 1 MG/ML
INJECTION INTRAMUSCULAR; INTRAVENOUS
Status: DISCONTINUED | OUTPATIENT
Start: 2023-09-29 | End: 2023-09-29 | Stop reason: HOSPADM

## 2023-09-29 RX ORDER — METOPROLOL SUCCINATE 50 MG/1
50 TABLET, EXTENDED RELEASE ORAL DAILY
Status: DISCONTINUED | OUTPATIENT
Start: 2023-09-29 | End: 2023-10-02 | Stop reason: HOSPADM

## 2023-09-29 RX ORDER — MAG HYDROX/ALUMINUM HYD/SIMETH 200-200-20
30 SUSPENSION, ORAL (FINAL DOSE FORM) ORAL 4 TIMES DAILY PRN
Status: DISCONTINUED | OUTPATIENT
Start: 2023-09-29 | End: 2023-10-02 | Stop reason: HOSPADM

## 2023-09-29 RX ORDER — ONDANSETRON 2 MG/ML
4 INJECTION INTRAMUSCULAR; INTRAVENOUS EVERY 4 HOURS PRN
Status: DISCONTINUED | OUTPATIENT
Start: 2023-09-29 | End: 2023-10-02 | Stop reason: HOSPADM

## 2023-09-29 RX ADMIN — INSULIN ASPART 1 UNITS: 100 INJECTION, SOLUTION INTRAVENOUS; SUBCUTANEOUS at 04:09

## 2023-09-29 RX ADMIN — SENNOSIDES AND DOCUSATE SODIUM 2 TABLET: 50; 8.6 TABLET ORAL at 08:09

## 2023-09-29 RX ADMIN — MUPIROCIN: 20 OINTMENT TOPICAL at 08:09

## 2023-09-29 RX ADMIN — INSULIN DETEMIR 20 UNITS: 100 INJECTION, SOLUTION SUBCUTANEOUS at 08:09

## 2023-09-29 RX ADMIN — LEVETIRACETAM 500 MG: 500 TABLET, FILM COATED ORAL at 04:09

## 2023-09-29 RX ADMIN — INSULIN ASPART 8 UNITS: 100 INJECTION, SOLUTION INTRAVENOUS; SUBCUTANEOUS at 05:09

## 2023-09-29 NOTE — Clinical Note
Mechanical thrombectomy performed with multiple balloon inflations with Chameleon 7 mm x 40 mm at 6-14 MELANIE for 10-15 seconds.

## 2023-09-29 NOTE — Clinical Note
The balloon was inflated with indeflator in the  . The balloon max pressure was 12 angelika for 30 seconds

## 2023-09-29 NOTE — Clinical Note
The balloon was inflated with indeflator in the  . The balloon max pressure was 22 angelika for 15 seconds

## 2023-09-29 NOTE — PLAN OF CARE
Patient states that she started process at [facility] for SNF placement at [facility]. Notified [facility]

## 2023-09-29 NOTE — Clinical Note
The balloon was inflated with indeflator in the  . The balloon max pressure was 14 angelika for 30 seconds

## 2023-09-29 NOTE — Clinical Note
65 ml of contrast were injected throughout the case. 15 mL of contrast was the total wasted during the case. 80 mL was the total amount used during the case.

## 2023-09-29 NOTE — H&P
[facility]  Hospital Medicine History & Physical Examination       Patient Name: Alycia Lewis  MRN: [id number]  Patient Class: IP- Inpatient   Admission Date: 9/29/2023   Admitting Physician: Feliciano Canales MD   Length of Stay: 0  Attending Physician: Hero Ambriz MD  Primary Care Provider: Rivka Vazquez NP  Face-to-Face encounter date: 09/29/2023  Code Status: Full Code  Chief Complaint:       Patient information was obtained from patient, patient's family, past medical records and ER records.     HISTORY OF PRESENT ILLNESS:   Alycia Lewis is a 52 y.o. White female with a past medical history of hypertension, hyperlipidemia, ESRD on home hemodialysis, diabetes mellitus type 2, diabetic neuropathy, thyroid disease, seizure disorder, obstructive sleep apnea not on CPAP, asthma, morbid obesity, glaucoma and right tibia/fibula fracture resulting in [place] on 9/14/2023 to 9/16/2023. Ortho consulted and no surgical intervention required. Patient was admitted to [facility] on 09/23/2023 acute uremic encephalopathy, hyperkalemia secondary to ESRD on dialysis missing dialysis and acute on chronic anemia requiring transfusion. Nephrology was consulted for dialysis which [facility] with her last dialysis session on 9/25/2023. Patient was noted to have a clotted AV graft in which General surgery was consulted on 09/28/2023 and it was recommended patient be transferred to [facility] for Interventional Nephrology for declotting and possible salvage of graft. Patient presented to [facility] on 9/29/2023 for vascular surgery Services.  On exam patient has no complaints. Patient has continues to be hemodynamically stable.  Labs this morning with H&H 8.2/27.1, MCV 99.3, BUN/creatinine 49/7.50, potassium 3.6, glucose 306.  Patient is admitted to hospital medicine services and further medical management.    PAST MEDICAL HISTORY:    Hypertension  Hyperlipidemia  ESRD  Diabetes mellitus type 2  Diabetic neuropathy  Thyroid disease   Obstructive sleep apnea not on CPAP due to anxiety   GERD   Seizure disorder   Osteoarthritis  Asthma  Glaucoma   Morbid obesity    PAST SURGICAL HISTORY:   Bimalleolar fracture repair   Dialysis tunnel catheter placement   Placement and removal of AV fistula from left arm  Right knee surgery   Hysterectomy  Cholecystectomy  Appendectomy   Partial thyroidectomy  Cataracts    ALLERGIES:   Adhesive and Toradol [ketorolac]    FAMILY HISTORY:   Reviewed and negative    SOCIAL HISTORY:   Reviewed and negative     HOME MEDICATIONS:     Prior to Admission medications    Medication Sig Start Date End Date Taking? Authorizing Provider   amLODIPine (NORVASC) 5 MG tablet Take 5 mg by mouth once daily.   Yes Provider, Historical   aspirin (ECOTRIN) 81 MG EC tablet Take 81 mg by mouth once daily.   Yes Provider, Historical   dipyridamole-aspirin 200-25 mg (AGGRENOX)  mg CM12 Take 2 capsules by mouth once daily.   Yes Provider, Historical   docusate sodium (COLACE) 100 MG capsule Take 100 mg by mouth 2 (two) times daily.   Yes Provider, Historical   metoprolol succinate (TOPROL-XL) 50 MG 24 hr tablet Take 50 mg by mouth once daily.   Yes Provider, Historical   pantoprazole (PROTONIX) 40 MG tablet Take 40 mg by mouth once daily.   Yes Provider, Historical   rosuvastatin (CRESTOR) 40 MG Tab Take 10 mg by mouth every evening.   Yes Provider, Historical   B complex w-C no.20/folic acid (NEPHROCAPS ORAL) Take 1 capsule by mouth once daily.    Provider, Historical   cyclobenzaprine (FLEXERIL) 10 MG tablet Take 10 mg by mouth 3 (three) times daily as needed for Muscle spasms.    Provider, Historical   insulin glargine (LANTUS U-100 INSULIN) 100 unit/mL injection Inject 20 Units into the skin every evening.    Provider, Historical   levETIRAcetam (KEPPRA) 500 MG Tab Take 500 mg by mouth once daily. Give postdialysis on dialysis  days    Provider, Historical   semaglutide (OZEMPIC SUBQ) Inject 0.75 mg into the skin once a week.    Provider, Historical       REVIEW OF SYSTEMS:   Except as documented, all other systems reviewed and negative     PHYSICAL EXAM:     VITAL SIGNS: 24 HRS MIN & MAX LAST   Temp  Min: 97.7 °F (36.5 °C)  Max: 97.7 °F (36.5 °C) 97.7 °F (36.5 °C)   BP  Min: 128/70  Max: 129/71 128/70   Pulse  Min: 89  Max: 91  91   Resp  Min: 18  Max: 18 18   SpO2  Min: 97 %  Max: 100 % 100 %       General appearance: Well-developed, well-nourished female in no apparent distress.  at bedside.  HEENT: Atraumatic head. Moist mucous membranes of oral cavity.  Lungs: Clear to auscultation bilaterally.   Heart: Regular rate and rhythm. AV fistula to left forearm without palpable and audible thrill.   Abdomen: Soft, non-distended, non-tender. Bowel sounds are normal.   Extremities: No cyanosis, clubbing. Cast to RLE.  Skin: No Rash. Warm and dry.  Neuro: Awake, alert and oriented. Motor and sensory exams grossly intact.  Psych/mental status: Appropriate mood and affect. Cooperative. Responds appropriately to questions.       LABS AND IMAGING:     Recent Labs   Lab 09/29/23  0503   WBC 11.02   RBC 2.73*   HGB 8.2*   HCT 27.1*   MCV 99.3*   MCH 30.0   MCHC 30.3*   RDW 14.7      MPV 9.9       Recent Labs   Lab 09/29/23  0504   *   K 3.6   CO2 22   BUN 49.0*   CREATININE 7.50*   CALCIUM 8.0*   MG 2.20   ALBUMIN 2.1*   ALKPHOS 94   ALT 10   AST 11   BILITOT 0.4       Microbiology Results (last 7 days)       ** No results found for the last 168 hours. **             MRI Lumbar Spine Without Contrast  Narrative: EXAMINATION:  MRI LUMBAR SPINE WITHOUT CONTRAST    CLINICAL HISTORY:  M54.16 M47.816; Radiculopathy, lumbar region    TECHNIQUE:  Multiplanar, multisequence MR images were acquired from the thoracolumbar junction to the sacrum without the administration of contrast.    COMPARISON:  None.    FINDINGS:  The vertebral body heights  are well maintained.  Alignment is well maintained.  No fracture is seen.  No dislocation is seen.  Cord ends at T12-L1.  Conus appears unremarkable.    At L1-L2 no disc bulge or herniation is seen.  No spinal or foraminal stenosis seen.  No significant facet arthropathy is seen.    At L2-L3 no significant facet arthropathy is seen.  There is minimal disc desiccation is seen.  No disc bulge or herniation is seen.  No spinal or foraminal stenosis is seen.    At L3-L4 no significant facet arthropathy is seen.  No disc bulge or herniation is seen.  No spinal or foraminal stenosis is seen.    At L4-5 there is endplate sclerosis and loss of disc height.  There is an anterior bridging osteophyte seen.  There is a broad-based disc osteophyte complex seen.  There is bilateral foraminal stenosis and nerve root impingement.  Mild facet arthropathy is seen bilaterally.  No spinal stenosis is seen and the canal measures 14 mm.    At L5-S1 there is some disc desiccation seen.  There is a broad-based disc bulge seen.  The disc bulge is right paracentral.  There is right foraminal stenosis and right nerve root impingement.  Mild facet arthropathy is seen bilaterally.  No spinal stenosis is seen and the canal measures 14 mm.  Impression: Degenerative changes seen at L4-5 and L5-S1 as outlined above    Electronically signed by: Mariela Farfan  Date:    07/03/2023  Time:    16:26        ASSESSMENT & PLAN:   Assessment:  Left AV fistula malfunction   ESRD on HD  Anemia of chronic disease, stable   Diabetes mellitus type 2   History of hypertension, hyperlipidemia, diabetic neuropathy, thyroid disease, seizure disorder, obstructive sleep apnea not on CPAP, asthma, morbid obesity, glaucoma and right tibial and fibular fracture     Plan:  - Vascular surgery and nephrology consulted.  Appreciate recommendations   - Continue to monitor hemoglobin transfusion as indicated  - Accu-Cheks and sliding scale  - Resume appropriate home  medications when deemed necessary   - Labs in AM      VTE Prophylaxis: will be placed on SCD for DVT prophylaxis and will be advised to be as mobile as possible and sit in a chair as tolerated      __________________________________________________________________________  INPATIENT LIST OF MEDICATIONS     Current Facility-Administered Medications:     acetaminophen tablet 650 mg, 650 mg, Oral, Q6H PRN, Kellee Dinh, AGACNP-BC    aluminum-magnesium hydroxide-simethicone 200-200-20 mg/5 mL suspension 30 mL, 30 mL, Oral, QID PRN, Kellee Dinh, AGACNP-BC    dextrose 10% bolus 125 mL 125 mL, 12.5 g, Intravenous, PRN, Kellee Dinh AGACNP-BC    dextrose 10% bolus 250 mL 250 mL, 25 g, Intravenous, PRN, Kellee Dinh AGACNP-BC    glucagon (human recombinant) injection 1 mg, 1 mg, Intramuscular, PRN, Kellee Dinh AGACNP-BC    insulin aspart U-100 injection 0-10 Units, 0-10 Units, Subcutaneous, Q6H PRN, Kellee Dinh AGACNP-BC, 8 Units at 09/29/23 0559    melatonin tablet 6 mg, 6 mg, Oral, Nightly PRN, Kellee Dinh AGACNP-BC    naloxone 0.4 mg/mL injection 0.02 mg, 0.02 mg, Intravenous, PRN, Kellee Dinh AGACNP-BC    ondansetron injection 4 mg, 4 mg, Intravenous, Q4H PRN, Kellee Dinh AGACNP-BC    polyethylene glycol packet 17 g, 17 g, Oral, BID PRN, Kellee Dinh AGACNP-BC    prochlorperazine injection Soln 5 mg, 5 mg, Intravenous, Q6H PRN, Kellee Dinh AGACNP-BC    simethicone chewable tablet 80 mg, 1 tablet, Oral, QID PRN, Kellee Dinh, AGACNP-BC      Scheduled Meds:  Continuous Infusions:  PRN Meds:.acetaminophen, aluminum-magnesium hydroxide-simethicone, dextrose 10%, dextrose 10%, glucagon (human recombinant), insulin aspart U-100, melatonin, naloxone, ondansetron, polyethylene glycol, prochlorperazine, simethicone      Discharge Planning and Disposition: Anticipated discharge to be determined.    IIrma PA, have reviewed and discussed the case with   Hero Ambriz.    Please see the following addendum for further assessment and plan from there attending MD.    Irma Jeronimo PA-C  09/29/2023

## 2023-09-29 NOTE — Clinical Note
Unable to obtain peripheral IV access. MD will access L FA graft and obtain central venous access and administer sedation medication at that time.

## 2023-09-29 NOTE — PROCEDURES
INTERVENTIONAL NEPHROLOGY PROCEDURE NOTE: PERCUTANEOUS THROMBECTOMY       Patient Name: Alycia Whitingsandee CHAMBERLAIN 1970    Procedure Date:    2023     Performing Physician:   Dr. Estevez    Access History: Pt is with ESRD on HD typically via left forearm loop arteriovenous graft who presents today with thrombosed HD access.    Pre-Op Diagnosis: T82.868A Thrombosis of vascular prosthetic devices, implants and grafts, initial encounter, N18.6 End Stage Renal Disease (ESRD).  Post-Op Diagnosis: Same    Procedure: Percutaneous thrombectomy of  left forearm loop arteriovenous graft.    Indication: Thrombosed HD access.    Informed Consent:  The patient was evaluated in the pre-operative area with assessment including the American Society of Anesthesia risk classification. The procedure is discussed in detail including risks, benefits alternatives and options and the patient agrees to proceed. Informed consent was obtained from the patient.     Maximum sterile barrier technique: The patient was prepped and draped using chlorhexidine prep and maximum sterile barrier technique.    Sedation Note:  Risks and benefits of sedation were reviewed with the patient or surrogate, including bleeding, infection, nausea, vomiting, dizziness, instability, damage to a nerve, damage to a blood vessel, cellulitis, reaction to medications, amnesia, loss of consciousness, respiratory arrest, cardiac arrest.     The patient received the following medications: Versed 2 mg IV and Fentanyl 100 mcg IV; patient did remain alert, responsive, and conversational throughout the procedure. I was personally responsible for supervising the administration of moderate sedation services during the procedure performed and I affirm all the guidelines and requirements described in the CPT 2023 section on moderate sedation were followed, including the use of an independent trained observer who had no other duties during the procedure.  Start sedation time was 1310 and stop time was 1433. The total face-to-face time was 83 minutes.    Procedure Steps:     The patient was prepped and draped in sterile fashion. Procedure ultrasound revealed thrombosed vascular access.     Our first portion of the procedure was dedicated to establishing an appropriate OUTFLOW segment:  Local anesthesia was administered by injecting 1% lidocaine at the intended site of cannulation. With use of live ultrasonographic visualization, the vascular access was successfully cannulated with an 18 gauge needle towards the venous end. A 150 cm glide wire was placed into the access and advanced until tip was parked in the SVC. The needle was removed over wire and exchanged for a 8 Fr sheath.    A Chameleon 7 mm x 4 cm was placed on the glide wire and advanced to the central vasculature. Central angiogram revealed patent superior vena cava, left innominate vein, and left subclavian vein. Pull-back angiogram was completed showing patent axillary vein, however clot burden identified at the venous anastomosis at the elbow joint. The venous anastomosis is made in the AC fossa, encompassing the  vein, brachial vein, and cephalic vein. Main outflow is the brachial vein.    Pull-back angiogram was continued back to the sheath, now with 2 mg of tPA being injected at the beginning of thrombus formation. Five minutes of gentle massage throughout the access was completed.    Glidewire was re-introduced with tip parked in the central veins. Then a Medtronic Chameleon 7 mm x 40 mm balloon was placed at the beginning of thrombus formation and mechanical thrombectomy by balloon maceration was underwent throughout the access down to the sheath. Multiple inflations were completed to 14-22 MELANIE for 1-5 seconds. The balloon was removed and clot aspiration was completed. Gentle angiogram was completed, revealing reduction in thrombus within the access. However, angiogram did reveal the  following lesions:  - Approximately 80% stenosis in the venous anastomosis (VA).    Angioplasty was performed at the VA using a Medtronic Chameleon 7 mm x 40 mm balloon. Approximately 100% effacement was obtained at 20-25 MELANIE and was held for 1-5 seconds. Post-angioplasty angiogram revealed 15% residual stenosis.    We repeated balloon maceration of thrombus and clot aspiration with appropriate reduction of thrombus within the access.    Back-bleeding was achieved.    We then focused our procedure on thrombectomizing the INFLOW segment:  Local anesthesia was administered by injecting 1% lidocaine at the intended site of cannulation. With use of live ultrasonographic visualization, a second cannulation was made with the needle pointing towards the arterial anastomosis. The 18 gauge needle was exchanged for a 6 Fr sheath. A 40 cm Shavon catheter was introduced over glide wire and was passed into the feeding artery. The Shavon balloon was inflated and pulled back through the access to the sheath while clots were aspirated. This was completed multiple times. A thrill was appreciated throughout the access after completion. Multiple angiograms were completed using the Shavon catheter to ensure no residual thrombus was seen throughout the access.      Angiogram did reveal a stenosis located near the arterial anastomosis/proximal fistula of approximately 40%. Angioplasty was performed at the AA using a Medtronic Chameleon 5 mm x 40 mm balloon. Approximately 100% effacement was obtained at 10-15 MELANIE and was held for 1-5 seconds. Post-angioplasty angiogram revealed 0% residual stenosis.    We completed angioplasty in the venous direction at the distal graft and brachial vein with the Chameleon 7 mm balloon to macerate some thrombus. There appeared to be no residual thrombus at case completion.    Patency throughout the vascular access was confirmed with multiple angiograms. Wire and sheath(s) removed and hemostasis was  achieved using a purse-string stitch and light digital pressure at the site(s) of cannulation.    ASSESSMENT/PLAN:  - Successful percutaneous thrombectomy.    EBL: 5 ml    Contrast: 65 ml    Complications: None    Post-op Instructions: The patient was given both verbal and written post-op instructions. If excessive bleeding at the site, they have been instructed to call their physician or proceed to a local emergency room.    Orders to the dialysis unit: OK to use access for dialysis needs.    Thank you for allowing me the opportunity in taking care of this patient. Please reach me with any questions.    Cristiano Estevez DO  Interventional Nephrology  Cell: [phone]

## 2023-09-29 NOTE — CONSULTS
INTERVENTIONAL NEPHROLOGY INITIAL CONSULTATION NOTE       Patient Name: Alycia Nixon Joshua CHAMBERLAIN 1970    Patient Seen Date: 2023  Patient Seen Time: 11:39 AM     Consult requested by: Hero Ambriz MD     Reason for consult: ESRD/HD management. Thrombosed left forearm AVG.       HPI: 52-year-old female with ESRD on HD via L forearm AVG (followed by Dr. Machado), HTN, HLD, DM2, GIO, and obesity who [place] [facility] [place] forearm and inability to complete dialysis. The pt was admitted at an OSH for uremic encephalopathy and hyperkalemia due to missed dialysis treatments. She underwent 3 days of individual dialysis treatments at that facility starting on 23. She was then found to be thrombosed. Last HD treatment was completed on 23. General surgery was consulted, and determined the pt would require transfer for thrombectomy of graft. Hence interventional nephrology/nephrology services was consulted.    Pt seen and examined at bedside this AM. She gives history, stating that she initiated diaylsis via [facility] in May 2022, and then had graft placed in 2022 by Dr. Dailey in [place]. She started use of the graft in 2022. She denies issues with the graft except for pressure alarms on HD machine recently. Currently the pt denies complaints.     Review of Systems:  General:  No fatigue  Skin: No rashes  HEENT: No vision changes  CVS: No CP  RS: No SOB  GIT: No abdominal pain  Extremities: No swelling  Neurological:  No focal weakness  Psych: No depression    Past Medical History:   Diagnosis Date    Transfusion reaction       No past surgical history on file.   Review of patient's allergies indicates:   Allergen Reactions    Adhesive Rash    Toradol [ketorolac] Other (See Comments)     Pt with h/o ESRD            No family history on file.      Current Facility-Administered Medications:     acetaminophen tablet 650 mg, 650 mg, Oral, Q6H PRN, Fabrizio  ALANIS Shepherd    aluminum-magnesium hydroxide-simethicone 200-200-20 mg/5 mL suspension 30 mL, 30 mL, Oral, QID PRN, Kellee Dinh AGACNP-BC    amLODIPine tablet 5 mg, 5 mg, Oral, Daily, Hero Ambriz MD    atorvastatin tablet 20 mg, 20 mg, Oral, Daily, Hero Ambriz MD    cyclobenzaprine tablet 10 mg, 10 mg, Oral, TID PRN, Hero Ambriz MD    dextrose 10% bolus 125 mL 125 mL, 12.5 g, Intravenous, PRN, Kellee Dinh AGACNP-BC    dextrose 10% bolus 250 mL 250 mL, 25 g, Intravenous, PRN, Kellee Dinh AGACNP-BC    dipyridamole-aspirin 200-25 mg per 12 hr capsule 2 capsule, 2 capsule, Oral, Daily, Hero Ambriz MD    glucagon (human recombinant) injection 1 mg, 1 mg, Intramuscular, PRN, Kellee Dinh AGACNP-BC    insulin aspart U-100 injection 0-10 Units, 0-10 Units, Subcutaneous, Q6H PRN, Kellee Dinh AGACNP-BC, 8 Units at 09/29/23 0559    insulin detemir U-100 injection 20 Units, 20 Units, Subcutaneous, QHS, Hero Ambriz MD    levETIRAcetam tablet 500 mg, 500 mg, Oral, Daily, Hero Ambriz MD    melatonin tablet 6 mg, 6 mg, Oral, Nightly PRN, Kellee Dinh AGACNP-BC    metoprolol succinate (TOPROL-XL) 24 hr tablet 50 mg, 50 mg, Oral, Daily, Hero Ambriz MD    naloxone 0.4 mg/mL injection 0.02 mg, 0.02 mg, Intravenous, PRN, Kellee Dinh AGACNP-BC    ondansetron injection 4 mg, 4 mg, Intravenous, Q4H PRN, Kellee Dinh AGACNP-BC    pantoprazole EC tablet 40 mg, 40 mg, Oral, Daily, Hero Ambriz MD    polyethylene glycol packet 17 g, 17 g, Oral, BID PRN, Kellee Dinh AGACNP-BC    prochlorperazine injection Soln 5 mg, 5 mg, Intravenous, Q6H PRN, Kellee Dinh AGACNP-BC    senna-docusate 8.6-50 mg per tablet 2 tablet, 2 tablet, Oral, BID, Hero Ambriz MD    simethicone chewable tablet 80 mg, 1 tablet, Oral, QID PRN, Kellee Dinh AGACNP-BC    Vital Signs (24 h):  Temp:  [97.7 °F (36.5 °C)-98.1 °F (36.7 °C)] 98.1 °F (36.7 °C)  Pulse:  [89-93] 91  Resp:  [17-18]  17  SpO2:  [97 %-100 %] 100 %  BP: (128-156)/(70-79) 156/79   No intake/output data recorded.  No intake/output data recorded.        Physical Exam:  General: NAD  HEENT: NC/AT, EOMI  CVS: RRR  RS: breathing easily    Abdominal: Soft, NT/ND.  Extremities: No edema b/l LE  Skin: No rash, no lesions.  Neurological: No focal deficits.  Psych: Normal affect  Dialysis Access: left forearm loop arteriovenous graft without thrill.    Results:    Lab Results   Component Value Date     (L) 09/29/2023    K 3.6 09/29/2023    CO2 22 09/29/2023    BUN 49.0 (H) 09/29/2023    CREATININE 7.50 (H) 09/29/2023     Lab Results   Component Value Date    WBC 11.02 09/29/2023    HGB 8.2 (L) 09/29/2023     09/29/2023    MCV 99.3 (H) 09/29/2023       Assessment and Plan:      ESRD on HD via L forearm graft.  Thrombosed graft.  Pt with ESRD on HD via L forearm graft that is thrombosed. Pt had her last HD treatment at an OSH on 9/25/23. She was then found to be thrombosed and transferred to this facility for thrombectomy and dialysis.  - Risks and benefits of percutaneous thrombectomy, tunneled catheter insertion, and intravenous conscious sedation was reviewed with the patient. The patient agrees to proceed with the intended procedure. Consents for intravenous conscious sedation and procedures were signed and placed within the chart.   - Labs do not reveal need for urgent dialysis today. Will consider dialysis today after procedure to maintain schedule.  - Further management as needed once thrombectomy is completed.    Anemia.  Pt with anemia likely in setting of ESRD. Hgb is low at 8.2 on admission.  - Check iron studies.  - Will consider MERCEDES.  - Monitor Hgb.    Thank you for your consult. Please feel free to reach me with any questions.  Plan for follow-up tomorrow.    Cristiano Estevez DO  Interventional Nephrology  Cell: [phone]

## 2023-09-29 NOTE — Clinical Note
The balloon was inflated with indeflator in the  . The balloon max pressure was 14 angelika for 18 seconds

## 2023-09-29 NOTE — Clinical Note
The balloon was inflated with indeflator in the  . The balloon max pressure was 22 angelika for 20 seconds

## 2023-09-29 NOTE — Clinical Note
Mechanical thrombectomy performed with multiple balloon inflations using Chameleon 7 mm balloon starting at [facility] through distal and middle graft at 14-22 MELANIE for 20-30 seconds.

## 2023-09-29 NOTE — PROGRESS NOTES
[facility] - 5th Floor Med Surg  Wound Care    Patient Name:  Alycia Lewis   MRN:  [id number]  Date: 9/29/2023  Diagnosis: <principal problem not specified>    History:     Past Medical History:   Diagnosis Date    Transfusion reaction        Social History     Socioeconomic History    Marital status:        Precautions:     Allergies as of 09/28/2023    (Not on File)       WOC Assessment Details/Treatment     Initial visit regarding affected area over left lateral heel, suggestive of deep tissue injury, foot floated on pillow , area cleansed and assessed and redressed with bordered foam dressing.    09/29/23 0930        Altered Skin Integrity 09/29/23 0100 Left posterior;lateral Heel #1 Other (comment)   Date First Assessed/Time First Assessed: 09/29/23 0100   Altered Skin Integrity Present on Admission - Did Patient arrive to the hospital with altered skin?: yes  Side: Left  Orientation: posterior;lateral  Location: Heel  Wound Number: #1  Primary Wo...   Description of Altered Skin Integrity Purple or maroon localized area of discolored intact skin or non-intact skin or a blood-filled blister.   Dressing Appearance Dry;Intact;Clean   Drainage Amount None   Appearance Maroon   Tissue loss description Not applicable   Periwound Area Intact   Wound Edges Defined   Wound Length (cm) 3 cm   Wound Width (cm) 3 cm   Wound Surface Area (cm^2) 9 cm^2   Care Cleansed with:;Soap and water   Dressing Reinforced;Foam   Off Loading   (floated on pillow , encouraged ROM of foot)   Skin Interventions   Pressure Reduction Devices positioning supports utilized;pressure-redistributing mattress utilized   Pressure Reduction Techniques frequent weight shift encouraged;heels elevated off bed   Skin Protection silicone foam dressing in place         Recommendations made to primary team Colette CORONA,for pressure injury prevention measures to include heel lift suspension boots and local wound care . Orders  placed.     09/29/2023

## 2023-09-29 NOTE — Clinical Note
The balloon was inflated with indeflator in the  . The balloon max pressure was 14 angelika for 35 seconds

## 2023-09-29 NOTE — PLAN OF CARE
09/29/23 1517   Discharge Assessment   Assessment Type Discharge Planning Assessment   Confirmed/corrected address, phone number and insurance Yes   Confirmed Demographics Correct on Facesheet   Source of Information patient   Communicated ISAAK with patient/caregiver Date not available/Unable to determine   Reason For Admission ESRD, thrombosis of dialysis graft   People in Home spouse   Do you expect to return to your current living situation? Other (see comments)  (started process for admission Chacha Castaneda)   Prior to hospitilization cognitive status: Alert/Oriented   Current cognitive status: Alert/Oriented   Walking or Climbing Stairs ambulation difficulty, dependent;stair climbing difficulty, dependent;transferring difficulty, dependent   Mobility Management NWB at present time   Dressing/Bathing bathing difficulty, assistance 1 person;dressing difficulty, assistance 1 person   Dressing/Bathing Management  helps her   Home Accessibility wheelchair accessible   Equipment Currently Used at Home walker, rolling;wheelchair   Readmission within 30 days? No   Patient currently being followed by outpatient case management? No   Do you currently have service(s) that help you manage your care at home? No   Do you take prescription medications? Yes   Do you have prescription coverage? Yes   Coverage Medicare A&B   Do you have any problems affording any of your prescribed medications? No   Is the patient taking medications as prescribed? yes   How do you get to doctors appointments? family or friend will provide   Are you on dialysis? Yes   DME Needed Upon Discharge  none   Discharge Plan discussed with: Patient   Transition of Care Barriers None   Discharge Plan A Skilled Nursing Facility   Discharge Plan B Rehab

## 2023-09-29 NOTE — Clinical Note
The balloon was inflated with indeflator in the  . The balloon max pressure was 10 angelika for 15 seconds

## 2023-09-29 NOTE — Clinical Note
The balloon was inflated with indeflator in the  . The balloon max pressure was 14 angelika for 37 seconds

## 2023-09-29 NOTE — NURSING
Nurses Note -- 4 Eyes      9/29/2023 0100   AM      Skin assessed during: Transfer      [] No Altered Skin Integrity Present    []Prevention Measures Documented      [x] Yes- Altered Skin Integrity Present or Discovered   [] LDA Added if Not in Epic (Describe Wound)   [x] New Altered Skin Integrity was Present on Admit and Documented in LDA   [] Wound Image Taken    Wound Care Consulted? Yes    Attending Nurse:  Dinorah Fofana RN    Second RN/Staff Member:   Caroline CORONA

## 2023-09-30 LAB
ALBUMIN SERPL-MCNC: 2 G/DL (ref 3.5–5)
BASOPHILS # BLD AUTO: 0.05 X10(3)/MCL
BASOPHILS NFR BLD AUTO: 0.6 %
BUN SERPL-MCNC: 33.6 MG/DL (ref 9.8–20.1)
CALCIUM SERPL-MCNC: 8.3 MG/DL (ref 8.4–10.2)
CHLORIDE SERPL-SCNC: 99 MMOL/L (ref 98–107)
CO2 SERPL-SCNC: 24 MMOL/L (ref 22–29)
CREAT SERPL-MCNC: 5.35 MG/DL (ref 0.55–1.02)
EOSINOPHIL # BLD AUTO: 0.15 X10(3)/MCL (ref 0–0.9)
EOSINOPHIL NFR BLD AUTO: 1.7 %
ERYTHROCYTE [DISTWIDTH] IN BLOOD BY AUTOMATED COUNT: 14.7 % (ref 11.5–17)
GFR SERPLBLD CREATININE-BSD FMLA CKD-EPI: 9 MLS/MIN/1.73/M2
GLUCOSE SERPL-MCNC: 272 MG/DL (ref 74–100)
HCT VFR BLD AUTO: 24.3 % (ref 37–47)
HCV AB SERPL QL IA: NONREACTIVE
HGB BLD-MCNC: 7.5 G/DL (ref 12–16)
IMM GRANULOCYTES # BLD AUTO: 0.3 X10(3)/MCL (ref 0–0.04)
IMM GRANULOCYTES NFR BLD AUTO: 3.4 %
LYMPHOCYTES # BLD AUTO: 1.56 X10(3)/MCL (ref 0.6–4.6)
LYMPHOCYTES NFR BLD AUTO: 17.6 %
MCH RBC QN AUTO: 30.4 PG (ref 27–31)
MCHC RBC AUTO-ENTMCNC: 30.9 G/DL (ref 33–36)
MCV RBC AUTO: 98.4 FL (ref 80–94)
MONOCYTES # BLD AUTO: 0.94 X10(3)/MCL (ref 0.1–1.3)
MONOCYTES NFR BLD AUTO: 10.6 %
NEUTROPHILS # BLD AUTO: 5.84 X10(3)/MCL (ref 2.1–9.2)
NEUTROPHILS NFR BLD AUTO: 66.1 %
NRBC BLD AUTO-RTO: 0 %
PHOSPHATE SERPL-MCNC: 2.7 MG/DL (ref 2.3–4.7)
PLATELET # BLD AUTO: 228 X10(3)/MCL (ref 130–400)
PMV BLD AUTO: 10.1 FL (ref 7.4–10.4)
POCT GLUCOSE: 252 MG/DL (ref 70–110)
POCT GLUCOSE: 264 MG/DL (ref 70–110)
POCT GLUCOSE: 276 MG/DL (ref 70–110)
POCT GLUCOSE: 322 MG/DL (ref 70–110)
POTASSIUM SERPL-SCNC: 3.4 MMOL/L (ref 3.5–5.1)
RBC # BLD AUTO: 2.47 X10(6)/MCL (ref 4.2–5.4)
SODIUM SERPL-SCNC: 136 MMOL/L (ref 136–145)
WBC # SPEC AUTO: 8.84 X10(3)/MCL (ref 4.5–11.5)

## 2023-09-30 PROCEDURE — 11000001 HC ACUTE MED/SURG PRIVATE ROOM

## 2023-09-30 PROCEDURE — 85025 COMPLETE CBC W/AUTO DIFF WBC: CPT | Performed by: INTERNAL MEDICINE

## 2023-09-30 PROCEDURE — 99232 PR SUBSEQUENT HOSPITAL CARE,LEVL II: ICD-10-PCS | Mod: ,,, | Performed by: STUDENT IN AN ORGANIZED HEALTH CARE EDUCATION/TRAINING PROGRAM

## 2023-09-30 PROCEDURE — 90935 HEMODIALYSIS ONE EVALUATION: CPT

## 2023-09-30 PROCEDURE — 80069 RENAL FUNCTION PANEL: CPT | Performed by: INTERNAL MEDICINE

## 2023-09-30 PROCEDURE — 63600175 PHARM REV CODE 636 W HCPCS: Performed by: NURSE PRACTITIONER

## 2023-09-30 PROCEDURE — 25000003 PHARM REV CODE 250: Performed by: INTERNAL MEDICINE

## 2023-09-30 PROCEDURE — 99232 SBSQ HOSP IP/OBS MODERATE 35: CPT | Mod: ,,, | Performed by: STUDENT IN AN ORGANIZED HEALTH CARE EDUCATION/TRAINING PROGRAM

## 2023-09-30 PROCEDURE — 97163 PT EVAL HIGH COMPLEX 45 MIN: CPT

## 2023-09-30 PROCEDURE — 63600175 PHARM REV CODE 636 W HCPCS: Performed by: INTERNAL MEDICINE

## 2023-09-30 RX ORDER — ASPIRIN AND DIPYRIDAMOLE 25; 200 MG/1; MG/1
1 CAPSULE, EXTENDED RELEASE ORAL 2 TIMES DAILY
Status: DISCONTINUED | OUTPATIENT
Start: 2023-09-30 | End: 2023-10-02 | Stop reason: HOSPADM

## 2023-09-30 RX ADMIN — ATORVASTATIN CALCIUM 20 MG: 10 TABLET, FILM COATED ORAL at 08:09

## 2023-09-30 RX ADMIN — ASPIRIN AND DIPYRIDAMOLE 1 CAPSULE: 25; 200 CAPSULE, EXTENDED RELEASE ORAL at 09:09

## 2023-09-30 RX ADMIN — LEVETIRACETAM 500 MG: 500 TABLET, FILM COATED ORAL at 08:09

## 2023-09-30 RX ADMIN — AMLODIPINE BESYLATE 5 MG: 5 TABLET ORAL at 08:09

## 2023-09-30 RX ADMIN — INSULIN DETEMIR 25 UNITS: 100 INJECTION, SOLUTION SUBCUTANEOUS at 09:09

## 2023-09-30 RX ADMIN — METOPROLOL SUCCINATE 50 MG: 50 TABLET, EXTENDED RELEASE ORAL at 08:09

## 2023-09-30 RX ADMIN — MUPIROCIN: 20 OINTMENT TOPICAL at 10:09

## 2023-09-30 RX ADMIN — INSULIN ASPART 4 UNITS: 100 INJECTION, SOLUTION INTRAVENOUS; SUBCUTANEOUS at 05:09

## 2023-09-30 RX ADMIN — SENNOSIDES AND DOCUSATE SODIUM 2 TABLET: 50; 8.6 TABLET ORAL at 08:09

## 2023-09-30 RX ADMIN — MUPIROCIN: 20 OINTMENT TOPICAL at 08:09

## 2023-09-30 RX ADMIN — INSULIN ASPART 6 UNITS: 100 INJECTION, SOLUTION INTRAVENOUS; SUBCUTANEOUS at 06:09

## 2023-09-30 RX ADMIN — PANTOPRAZOLE SODIUM 40 MG: 40 TABLET, DELAYED RELEASE ORAL at 08:09

## 2023-09-30 NOTE — PROGRESS NOTES
NEPHROLOGY PROGRESS NOTE       Patient Name: Alycia Nixon Joshua CHAMBERLAIN 1970    Date: 2023  Time: 12:14 PM      Reason for consult: ESRD/HD management. Thrombosed left forearm AVG.       HPI: 52-year-old female with ESRD on HD via L forearm AVG (followed by Dr. Machado), HTN, HLD, DM2, GIO, and obesity who [place] [facility] [place] and inability to complete dialysis. The pt was admitted at an OSH for uremic encephalopathy and hyperkalemia due to missed dialysis treatments. She underwent 3 days of individual dialysis treatments at that facility starting on 23. She was then found to be thrombosed. Last HD treatment was completed on 23. General surgery was consulted, and determined the pt would require transfer for thrombectomy of graft. Hence interventional nephrology/nephrology services was consulted. Pt underwent successful percutaneous thrombectomy of her graft on 23 and subsequent dialysis.     Interval History: Pt seen and examined at bedside this AM. Denies complaints.     Review of Systems:  General:  No fatigue  Skin: No rashes  HEENT: No vision changes  CVS: No CP  RS: No SOB  GIT: No abdominal pain  Extremities: No swelling  Neurological:  No focal weakness  Psych: No depression        Current Facility-Administered Medications:     acetaminophen tablet 650 mg, 650 mg, Oral, Q6H PRN, Kellee Dinh AGACNP-BC    aluminum-magnesium hydroxide-simethicone 200-200-20 mg/5 mL suspension 30 mL, 30 mL, Oral, QID PRN, Kellee Dinh AGACNP-BC    amLODIPine tablet 5 mg, 5 mg, Oral, Daily, Hero Ambriz MD, 5 mg at 23 0825    atorvastatin tablet 20 mg, 20 mg, Oral, Daily, Hero Ambriz MD, 20 mg at 23 0825    cyclobenzaprine tablet 10 mg, 10 mg, Oral, TID PRN, Hero Ambriz MD    dextrose 10% bolus 125 mL 125 mL, 12.5 g, Intravenous, PRN, Kellee Dinh AGACNP-BC    dextrose 10% bolus 250 mL 250 mL, 25 g, Intravenous, PRN, Kellee Dinh  JOSE-BC    dipyridamole-aspirin 200-25 mg per 12 hr capsule 2 capsule, 2 capsule, Oral, Daily, Hero Ambriz MD    glucagon (human recombinant) injection 1 mg, 1 mg, Intramuscular, PRN, Kellee Dinh AGACNP-BC    insulin aspart U-100 injection 0-10 Units, 0-10 Units, Subcutaneous, Q6H PRN, Kellee Dinh AGACNP-BC, 6 Units at 09/30/23 0636    insulin detemir U-100 injection 25 Units, 25 Units, Subcutaneous, QHS, Hero Ambriz MD    levETIRAcetam tablet 500 mg, 500 mg, Oral, Daily, Hero Ambriz MD, 500 mg at 09/30/23 0825    melatonin tablet 6 mg, 6 mg, Oral, Nightly PRN, Kellee Dinh AGACNP-BC    metoprolol succinate (TOPROL-XL) 24 hr tablet 50 mg, 50 mg, Oral, Daily, Hero Ambriz MD, 50 mg at 09/30/23 0825    mupirocin 2 % ointment, , Nasal, BID, Hero Ambriz MD, Given at 09/30/23 0826    naloxone 0.4 mg/mL injection 0.02 mg, 0.02 mg, Intravenous, PRN, Kellee Dinh AGACNP-BC    ondansetron injection 4 mg, 4 mg, Intravenous, Q4H PRN, Kellee Dinh AGACNP-BC    pantoprazole EC tablet 40 mg, 40 mg, Oral, Daily, Hero Ambriz MD, 40 mg at 09/30/23 0825    polyethylene glycol packet 17 g, 17 g, Oral, BID PRN, Kellee Dinh AGACNP-BC    prochlorperazine injection Soln 5 mg, 5 mg, Intravenous, Q6H PRN, Kellee Dinh AGACNP-BC    senna-docusate 8.6-50 mg per tablet 2 tablet, 2 tablet, Oral, BID, Hero Ambriz MD, 2 tablet at 09/30/23 0825    simethicone chewable tablet 80 mg, 1 tablet, Oral, QID PRN, Kellee Dinh, AGACNP-BC    Vital Signs (24 h):  Temp:  [97.9 °F (36.6 °C)-99.1 °F (37.3 °C)] 99 °F (37.2 °C)  Pulse:  [] 100  Resp:  [17-20] 17  SpO2:  [95 %-100 %] 99 %  BP: ()/(48-80) 103/58   I/O last 3 completed shifts:  In: -   Out: 500 [Other:500]  No intake/output data recorded.        Physical Exam:  General: NAD  HEENT: NC/AT, EOMI  CVS: RRR  RS: breathing easily    Abdominal: Soft, NT/ND.  Extremities: No edema b/l LE  Skin: No rash, no lesions.  Neurological: No  focal deficits.  Psych: Normal affect  Dialysis Access: left forearm loop arteriovenous graft with good thrill.    Results:    Lab Results   Component Value Date     09/30/2023    K 3.4 (L) 09/30/2023    CO2 24 09/30/2023    BUN 33.6 (H) 09/30/2023    CREATININE 5.35 (H) 09/30/2023    CALCIUM 8.3 (L) 09/30/2023    PHOS 2.7 09/30/2023    WBC 8.84 09/30/2023    HGB 7.5 (L) 09/30/2023    HCT 24.3 (L) 09/30/2023     09/30/2023       Assessment and Plan:      ESRD on HD via L forearm graft.  Thrombosed graft. -- RESOLVED.  Pt with ESRD on HD via L forearm graft that is thrombosed. Pt had her last HD treatment at an OSH on 9/25/23. She was then found to be thrombosed and transferred to this facility for thrombectomy and dialysis. Pt underwent successful thrombectomy on 9/29/23 and underwent dialysis thereafter.   - Plan on HD today, followed by TTS schedule.     Anemia.  Pt with anemia likely in setting of ESRD. Hgb is low at 7.5 this AM.  - Check iron studies.  - May need to consider blood transfusion.  - Monitor Hgb.    Thank you for your consult. Please feel free to reach me with any questions.  Plan for follow-up tomorrow.    Cristiano Estevez DO  Interventional Nephrology  Cell: [phone]

## 2023-09-30 NOTE — PT/OT/SLP EVAL
Physical Therapy Evaluation    Patient Name:  Alycia Lewis   MRN:  [id number]    Recommendations:     Discharge Recommendations: nursing facility, skilled   Discharge Equipment Recommendations:   tbd  Barriers to discharge:  complexity of medical condition    Assessment:     Alycia Lewis is a 52 y.o. female admitted with a medical diagnosis of AV shunt malfunction. S/P thrombectomy L forearm AV graft.  She presents with the following impairments/functional limitations: weakness, impaired endurance, impaired functional mobility, decreased lower extremity function . Had R tib fx 3 wks ago.    Rehab Prognosis: Good; patient would benefit from acute skilled PT services to address these deficits and reach maximum level of function.    Recent Surgery: Procedure(s) (LRB):  THROMBECTOMY (Left) 1 Day Post-Op    Plan:     During this hospitalization, patient to be seen 5 x/week to address the identified rehab impairments via therapeutic activities, therapeutic exercises, neuromuscular re-education and progress toward the following goals:    Plan of Care Expires:  10/28/23    Subjective     Chief Complaint: complain of weakness  Patient/Family Comments/goals: be able to walk  Pain/Comfort:  Pain Rating 1: 4/10  Location - Side 1: Right  Location 1: leg    Patients cultural, spiritual, Cheondoism conflicts given the current situation: no    Living Environment:  Lives at home with , in a mobile home with ramp  Prior to admission, patients level of function was modified independent in ADL and short distances mobility.  Equipment used at home: walker, rolling, wheelchair, power chair.  Upon discharge, patient will have assistance from SNF placement.    Objective:     Communicated with nursing prior to session.  Patient found HOB elevated with peripheral IV, PureWick, telemetry, Other (comments) (R foot splint/half cast)  upon PT entry to room.    General Precautions: Standard, fall  Orthopedic  Precautions:RLE non weight bearing   Braces:  wears splint/half cast on R foot  Respiratory Status: Room air  /67, HR 91    Exams:  RLE ROM: WFL  RLE Strength: Deficits: 2+/5  LLE ROM: WFL  LLE Strength: WFL    Functional Mobility:  Bed Mobility:     Supine to Sit: moderate assistance and of 2x persons  Sit to Supine: moderate assistance and of 2x persons      AM-PAC 6 CLICK MOBILITY  Total Score:8       Patient left HOB elevated with all lines intact and call button in reach.    GOALS:   Multidisciplinary Problems       Physical Therapy Goals          Problem: Physical Therapy    Goal Priority Disciplines Outcome Goal Variances Interventions   Physical Therapy Goal     PT, PT/OT Ongoing, Progressing     Description: Goals to be met by: 10/28/23     Patient will increase functional independence with mobility by performin. Supine to sit with Stand-by Assistance  2. Sit to supine with Stand-by Assistance  3. Sit to stand transfer with Moderate Assistance  4. Bed to chair transfer with Moderate Assistance using Rolling Walker                         History:     Past Medical History:   Diagnosis Date    Transfusion reaction        No past surgical history on file.    Time Tracking:     PT Received On:    PT Start Time: 840     PT Stop Time: 902  PT Total Time (min): 22 min     Billable Minutes: Evaluation high complexity      2023

## 2023-09-30 NOTE — NURSING
09/29/23 2000   Post-Hemodialysis Assessment   Rinseback Volume (mL) 250 mL   Blood Volume Processed (Liters) 42.4 L   Dialyzer Clearance Lightly streaked   Duration of Treatment 150 minutes   Total UF (mL) 500 mL   Net Fluid Removal 0   Post-Hemodialysis Comments HD completed.  Net UF: 0.  Left arm AVG was cannulated and used without difficulty with good flow.  Pt tolerated treatment well.

## 2023-09-30 NOTE — PLAN OF CARE
Problem: Physical Therapy  Goal: Physical Therapy Goal  Description: Goals to be met by: 10/28/23     Patient will increase functional independence with mobility by performin. Supine to sit with Stand-by Assistance  2. Sit to supine with Stand-by Assistance  3. Sit to stand transfer with Moderate Assistance  4. Bed to chair transfer with Moderate Assistance using Rolling Walker    Outcome: Ongoing, Progressing

## 2023-09-30 NOTE — PROGRESS NOTES
[facility] - 5th Floor Med Surg  [facility] MEDICINE ~ PROGRESS NOTE        CHIEF COMPLAINT   Hospital follow up    HOSPITAL COURSE   Alycia Lewis is a 52 y.o. White female with a past medical history of hypertension, hyperlipidemia, ESRD on home hemodialysis, diabetes mellitus type 2, diabetic neuropathy, thyroid disease, seizure disorder, obstructive sleep apnea not on CPAP, asthma, morbid obesity, glaucoma and right tibia/fibula fracture resulting in admission to [facility] on 9/14/2023 to 9/16/2023. Ortho consulted and no surgical intervention required. Patient was admitted to [facility] on 09/23/2023 acute uremic encephalopathy, hyperkalemia secondary to ESRD on dialysis missing dialysis and acute on chronic anemia requiring transfusion. Nephrology was consulted for dialysis which was done three days in arow with her last dialysis session on 9/25/2023. Patient was noted to have a clotted AV graft in which General surgery was consulted on 09/28/2023 and it was recommended patient be transferred to [facility] for Interventional Nephrology for declotting and possible salvage of graft. Patient presented to [facility] on 9/29/2023 for vascular surgery Services.  On exam patient has no complaints. Patient has continues to be hemodynamically stable.  Labs this morning with H&H 8.2/27.1, MCV 99.3, BUN/creatinine 49/7.50, potassium 3.6, glucose 306.  Patient is admitted to hospital medicine services and further medical management.  Interventional nephrologist performed percutaneous thrombectomy of left arm AV graft September 29.    Today  Underwent dialysis yesterday with good flow.  Doing well this morning.  At this time we are waiting for SNF placement, unlikely that she can be placed today over the weekend due to new admit.        OBJECTIVE/PHYSICAL EXAM     VITAL SIGNS (MOST RECENT):  Temp: 99 °F (37.2 °C) (09/30/23 0920)  Pulse: 100 (09/30/23 0920)  Resp: 17 (09/30/23  0920)  BP: (!) 103/58 (09/30/23 0920)  SpO2: 99 % (09/30/23 0920) VITAL SIGNS (24 HOUR RANGE):  Temp:  [97.9 °F (36.6 °C)-99.1 °F (37.3 °C)] 99 °F (37.2 °C)  Pulse:  [] 100  Resp:  [17-20] 17  SpO2:  [95 %-100 %] 99 %  BP: ()/(48-80) 103/58   GENERAL: In no acute distress, afebrile  HEENT:  CHEST: Clear to auscultation bilaterally  HEART: S1, S2, no appreciable murmur  ABDOMEN: Soft, nontender, BS +  MSK: Warm, no lower extremity edema, no clubbing or cyanosis, right lower extremity in a cast  NEUROLOGIC: Alert and oriented x4, moving all extremities with good strength   INTEGUMENTARY:  PSYCHIATRY:        ASSESSMENT/PLAN   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  Case management following.  Medically stable for discharge to SNF.  Unlikely she can be placed at SNF over the weekend due to her being a new admit.    DVT prophylaxis:     Anticipated discharge and disposition:  Plan for discharge on Monday  __________________________________________________________________________    LABS/MICRO/MEDS/DIAGNOSTICS       LABS  Recent Labs     09/29/23  0504 09/30/23  0517   * 136   K 3.6 3.4*   CHLORIDE 101 99   CO2 22 24   BUN 49.0* 33.6*   CREATININE 7.50* 5.35*   GLUCOSE 306* 272*   CALCIUM 8.0* 8.3*   ALKPHOS 94  --    AST 11  --    ALT 10  --    ALBUMIN 2.1* 2.0*     Recent Labs     09/30/23  0517   WBC 8.84   RBC 2.47*   HCT 24.3*   MCV 98.4*          MICROBIOLOGY  Microbiology Results (last 7 days)       ** No results found for the last 168 hours. **               MEDICATIONS   amLODIPine  5 mg Oral Daily    atorvastatin  20 mg Oral Daily    dipyridamole-aspirin 200-25 mg  2 capsule Oral Daily    insulin detemir U-100  20  "Units Subcutaneous QHS    levETIRAcetam  500 mg Oral Daily    metoprolol succinate  50 mg Oral Daily    mupirocin   Nasal BID    pantoprazole  40 mg Oral Daily    senna-docusate 8.6-50 mg  2 tablet Oral BID         INFUSIONS         DIAGNOSTIC TESTS  X-Ray Tibia Fibula 2 View Right   Final Result      As above.         Electronically signed by: Alvarado Chaudhry   Date:    09/29/2023   Time:    15:47           No results found for: "EF"       NUTRITION STATUS  Patient meets ASPEN criteria for   malnutrition of   per RD assessment as evidenced by:                       A minimum of two characteristics is recommended for diagnosis of either severe or non-severe malnutrition.       Case related differential diagnoses have been reviewed; assessment and plan has been documented. I have personally reviewed the labs and test results that are currently available; I have reviewed the patients medication list. I have reviewed the consulting providers recommendations. I have reviewed or attempted to review medical records based upon their availability.  All of the patient's and/or family's questions have been addressed and answered to the best of my ability.  I will continue to monitor closely and make adjustments to medical management as needed.  This document was created using M*Modal Fluency Direct.  Transcription errors may have been made.  Please contact me if any questions may rise regarding documentation to clarify transcription.        Hero Ambriz MD   Internal Medicine  Department of Hospital Medicine [facility] - 5th Floor Med Surg               "

## 2023-09-30 NOTE — NURSING
09/30/23 1315   Post-Hemodialysis Assessment   Blood Volume Processed (Liters) 47.4 L   Dialyzer Clearance Lightly streaked   Duration of Treatment 120 minutes   Total UF (mL) 500 mL   Patient Response to Treatment Tolerated well   Post-Hemodialysis Comments Treatment completed. NET fluid removed = 500 ml. No issues during tx. Left AVG functioned well. No comlpaints.

## 2023-10-01 LAB
POCT GLUCOSE: 240 MG/DL (ref 70–110)
POCT GLUCOSE: 253 MG/DL (ref 70–110)
POCT GLUCOSE: 259 MG/DL (ref 70–110)
POCT GLUCOSE: 321 MG/DL (ref 70–110)

## 2023-10-01 PROCEDURE — 11000001 HC ACUTE MED/SURG PRIVATE ROOM

## 2023-10-01 PROCEDURE — 99232 SBSQ HOSP IP/OBS MODERATE 35: CPT | Mod: ,,, | Performed by: STUDENT IN AN ORGANIZED HEALTH CARE EDUCATION/TRAINING PROGRAM

## 2023-10-01 PROCEDURE — 25000003 PHARM REV CODE 250: Performed by: INTERNAL MEDICINE

## 2023-10-01 PROCEDURE — 97166 OT EVAL MOD COMPLEX 45 MIN: CPT

## 2023-10-01 PROCEDURE — 63600175 PHARM REV CODE 636 W HCPCS: Performed by: NURSE PRACTITIONER

## 2023-10-01 PROCEDURE — 99232 PR SUBSEQUENT HOSPITAL CARE,LEVL II: ICD-10-PCS | Mod: ,,, | Performed by: STUDENT IN AN ORGANIZED HEALTH CARE EDUCATION/TRAINING PROGRAM

## 2023-10-01 RX ORDER — FOLIC ACID 1 MG/1
1 TABLET ORAL DAILY
Status: DISCONTINUED | OUTPATIENT
Start: 2023-10-01 | End: 2023-10-02 | Stop reason: HOSPADM

## 2023-10-01 RX ADMIN — INSULIN ASPART 6 UNITS: 100 INJECTION, SOLUTION INTRAVENOUS; SUBCUTANEOUS at 04:10

## 2023-10-01 RX ADMIN — LEVETIRACETAM 500 MG: 500 TABLET, FILM COATED ORAL at 09:10

## 2023-10-01 RX ADMIN — ASPIRIN AND DIPYRIDAMOLE 1 CAPSULE: 25; 200 CAPSULE, EXTENDED RELEASE ORAL at 09:10

## 2023-10-01 RX ADMIN — PANTOPRAZOLE SODIUM 40 MG: 40 TABLET, DELAYED RELEASE ORAL at 09:10

## 2023-10-01 RX ADMIN — MUPIROCIN: 20 OINTMENT TOPICAL at 09:10

## 2023-10-01 RX ADMIN — FOLIC ACID 1 MG: 1 TABLET ORAL at 09:10

## 2023-10-01 RX ADMIN — AMLODIPINE BESYLATE 5 MG: 5 TABLET ORAL at 09:10

## 2023-10-01 RX ADMIN — ATORVASTATIN CALCIUM 20 MG: 10 TABLET, FILM COATED ORAL at 09:10

## 2023-10-01 RX ADMIN — INSULIN ASPART 4 UNITS: 100 INJECTION, SOLUTION INTRAVENOUS; SUBCUTANEOUS at 11:10

## 2023-10-01 RX ADMIN — SENNOSIDES AND DOCUSATE SODIUM 2 TABLET: 50; 8.6 TABLET ORAL at 09:10

## 2023-10-01 RX ADMIN — INSULIN ASPART 4 UNITS: 100 INJECTION, SOLUTION INTRAVENOUS; SUBCUTANEOUS at 06:10

## 2023-10-01 RX ADMIN — METOPROLOL SUCCINATE 50 MG: 50 TABLET, EXTENDED RELEASE ORAL at 09:10

## 2023-10-01 NOTE — PROGRESS NOTES
[facility] - 5th Floor [facility] Surg  [facility] MEDICINE ~ PROGRESS NOTE        CHIEF COMPLAINT   Hospital follow up    HOSPITAL COURSE   Alycia Lewis is a 52 y.o. White female with a past medical history of hypertension, hyperlipidemia, ESRD on home hemodialysis, diabetes mellitus type 2, diabetic neuropathy, thyroid disease, seizure disorder, obstructive sleep apnea not on CPAP, asthma, morbid obesity, glaucoma and right tibia/fibula fracture resulting in admission to [facility] on 9/14/2023 to 9/16/2023. Ortho consulted and no surgical intervention required. Patient was admitted to [facility] on 09/23/2023 acute uremic encephalopathy, hyperkalemia secondary to ESRD on dialysis missing dialysis and acute on chronic anemia requiring transfusion. Nephrology was consulted for dialysis which was done three days in arow with her last dialysis session on 9/25/2023. Patient was noted to have a clotted AV graft in which General surgery was consulted on 09/28/2023 and it was recommended patient be transferred to [facility] for Interventional Nephrology for declotting and possible salvage of graft. Patient presented to [facility] on 9/29/2023 for vascular surgery Services.  On exam patient has no complaints. Patient has continues to be hemodynamically stable.  Labs this morning with H&H 8.2/27.1, MCV 99.3, BUN/creatinine 49/7.50, potassium 3.6, glucose 306.  Patient is admitted to hospital medicine services and further medical management.  Interventional nephrologist performed percutaneous thrombectomy of left arm AV graft September 29.    Today  No new issues.  Doing well.        OBJECTIVE/PHYSICAL EXAM     VITAL SIGNS (MOST RECENT):  Temp: 98.2 °F (36.8 °C) (10/01/23 0748)  Pulse: 101 (10/01/23 0748)  Resp: 18 (10/01/23 0748)  BP: 117/73 (10/01/23 0748)  SpO2: 98 % (10/01/23 0444) VITAL SIGNS (24 HOUR RANGE):  Temp:  [97.7 °F (36.5 °C)-99 °F (37.2 °C)] 98.2 °F (36.8 °C)  Pulse:   [] 101  Resp:  [17-18] 18  SpO2:  [98 %-99 %] 98 %  BP: (103-146)/(58-79) 117/73   GENERAL: In no acute distress, afebrile  HEENT:  CHEST: Clear to auscultation bilaterally  HEART: S1, S2, no appreciable murmur  ABDOMEN: Soft, nontender, BS +  MSK: Warm, no lower extremity edema, no clubbing or cyanosis, right lower extremity in a cast  NEUROLOGIC: Alert and oriented x4, moving all extremities with good strength   INTEGUMENTARY:  PSYCHIATRY:        ASSESSMENT/PLAN   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  Tolerating dialysis.  Need some erythropoietin.  She required 1 unit of blood on admission as well.  Case management following.  Medically stable for discharge to SNF.  Unlikely she can be placed at SNF over the weekend due to her being a new admit.    DVT prophylaxis:     Anticipated discharge and disposition:  Plan for discharge on Monday  __________________________________________________________________________    LABS/MICRO/MEDS/DIAGNOSTICS       LABS  Recent Labs     09/29/23  0504 09/30/23  0517   * 136   K 3.6 3.4*   CHLORIDE 101 99   CO2 22 24   BUN 49.0* 33.6*   CREATININE 7.50* 5.35*   GLUCOSE 306* 272*   CALCIUM 8.0* 8.3*   ALKPHOS 94  --    AST 11  --    ALT 10  --    ALBUMIN 2.1* 2.0*       Recent Labs     09/30/23  0517   WBC 8.84   RBC 2.47*   HCT 24.3*   MCV 98.4*            MICROBIOLOGY  Microbiology Results (last 7 days)       ** No results found for the last 168 hours. **               MEDICATIONS   amLODIPine  5 mg Oral Daily    atorvastatin  20 mg Oral Daily    dipyridamole-aspirin 200-25 mg  1 capsule Oral BID    insulin detemir U-100  25 Units Subcutaneous QHS    levETIRAcetam  500 mg Oral Daily     "metoprolol succinate  50 mg Oral Daily    mupirocin   Nasal BID    pantoprazole  40 mg Oral Daily    senna-docusate 8.6-50 mg  2 tablet Oral BID         INFUSIONS         DIAGNOSTIC TESTS  X-Ray Tibia Fibula 2 View Right   Final Result      As above.         Electronically signed by: Alvarado Chaudhry   Date:    09/29/2023   Time:    15:47           No results found for: "EF"       NUTRITION STATUS  Patient meets ASPEN criteria for   malnutrition of   per RD assessment as evidenced by:                       A minimum of two characteristics is recommended for diagnosis of either severe or non-severe malnutrition.       Case related differential diagnoses have been reviewed; assessment and plan has been documented. I have personally reviewed the labs and test results that are currently available; I have reviewed the patients medication list. I have reviewed the consulting providers recommendations. I have reviewed or attempted to review medical records based upon their availability.  All of the patient's and/or family's questions have been addressed and answered to the best of my ability.  I will continue to monitor closely and make adjustments to medical management as needed.  This document was created using M*Modal Fluency Direct.  Transcription errors may have been made.  Please contact me if any questions may rise regarding documentation to clarify transcription.        Hero Ambriz MD   Internal Medicine  Department of Hospital Medicine  [facility] - 5th Floor Med Surg               "

## 2023-10-01 NOTE — PLAN OF CARE
Problem: Adult Inpatient Plan of Care  Goal: Plan of Care Review  Outcome: Ongoing, Progressing  Goal: Patient-Specific Goal (Individualized)  Outcome: Ongoing, Progressing  Goal: Absence of Hospital-Acquired Illness or Injury  Outcome: Ongoing, Progressing  Goal: Optimal Comfort and Wellbeing  Outcome: Ongoing, Progressing  Goal: Readiness for Transition of Care  Outcome: Ongoing, Progressing     Problem: Fall Injury Risk  Goal: Absence of Fall and Fall-Related Injury  Outcome: Ongoing, Progressing     Problem: Skin Injury Risk Increased  Goal: Skin Health and Integrity  Outcome: Ongoing, Progressing     Problem: Bariatric Environmental Safety  Goal: Safety Maintained with Care  Outcome: Ongoing, Progressing     Problem: Impaired Wound Healing  Goal: Optimal Wound Healing  Outcome: Ongoing, Progressing     Problem: Device-Related Complication Risk (Hemodialysis)  Goal: Safe, Effective Therapy Delivery  Outcome: Ongoing, Progressing     Problem: Hemodynamic Instability (Hemodialysis)  Goal: Effective Tissue Perfusion  Outcome: Ongoing, Progressing     Problem: Infection (Hemodialysis)  Goal: Absence of Infection Signs and Symptoms  Outcome: Ongoing, Progressing

## 2023-10-01 NOTE — PROGRESS NOTES
NEPHROLOGY PROGRESS NOTE       Patient Name: Alycia Nixon Joshua CHAMBERLAIN 1970    Date: 10/1/2023  Time: 10:19 AM      Reason for consult: ESRD/HD management. Thrombosed left forearm AVG.       HPI: 52-year-old female with ESRD on HD via L forearm AVG (followed by Dr. Machado), HTN, HLD, DM2, GIO, and obesity who was transferred to [facility] for thrombosed L forearm and inability to complete dialysis. The pt was admitted at an OSH for uremic encephalopathy and hyperkalemia due to missed dialysis treatments. She underwent 3 days of individual dialysis treatments at that facility starting on 23. She was then found to be thrombosed. Last HD treatment was completed on 23. General surgery was consulted, and determined the pt would require transfer for thrombectomy of graft. Hence interventional nephrology/nephrology services was consulted. Pt underwent successful percutaneous thrombectomy of her graft on 23 and subsequent dialysis.     Interval History: Pt seen and examined at bedside this AM. Tolerated HD treatment yesterday, with graft functioning well. No complaints.     Review of Systems:  General:  No fatigue  Skin: No rashes  HEENT: No vision changes  CVS: No CP  RS: No SOB  GIT: No abdominal pain  Extremities: No swelling  Neurological:  No focal weakness  Psych: No depression        Current Facility-Administered Medications:     acetaminophen tablet 650 mg, 650 mg, Oral, Q6H PRN, Kellee Dinh AGACNP-BC    aluminum-magnesium hydroxide-simethicone 200-200-20 mg/5 mL suspension 30 mL, 30 mL, Oral, QID PRN, Kellee Dinh AGACNP-BC    amLODIPine tablet 5 mg, 5 mg, Oral, Daily, Hero Ambriz MD, 5 mg at 10/01/23 0928    atorvastatin tablet 20 mg, 20 mg, Oral, Daily, Hero Ambriz MD, 20 mg at 10/01/23 0928    cyclobenzaprine tablet 10 mg, 10 mg, Oral, TID PRN, Hero Ambriz MD    dextrose 10% bolus 125 mL 125 mL, 12.5 g, Intravenous, PRN, Kellee Dinh AGACNP-BC    dextrose 10% bolus  250 mL 250 mL, 25 g, Intravenous, PRN, Kellee Dinh AGACNP-BC    dipyridamole-aspirin 200-25 mg per 12 hr capsule 1 capsule, 1 capsule, Oral, BID, Hero Ambriz MD, 1 capsule at 10/01/23 0938    folic acid tablet 1 mg, 1 mg, Oral, Daily, Hero Ambriz MD, 1 mg at 10/01/23 0928    glucagon (human recombinant) injection 1 mg, 1 mg, Intramuscular, PRN, Kellee Dinh AGACNP-BC    insulin aspart U-100 injection 0-10 Units, 0-10 Units, Subcutaneous, Q6H PRN, Kellee Dinh AGACNP-BC, 4 Units at 10/01/23 0612    insulin detemir U-100 injection 25 Units, 25 Units, Subcutaneous, QHS, Hero Ambriz MD, 25 Units at 09/30/23 2140    levETIRAcetam tablet 500 mg, 500 mg, Oral, Daily, Hero Ambriz MD, 500 mg at 10/01/23 0928    melatonin tablet 6 mg, 6 mg, Oral, Nightly PRN, Kellee Dinh AGACNP-BC    metoprolol succinate (TOPROL-XL) 24 hr tablet 50 mg, 50 mg, Oral, Daily, Hero Ambriz MD, 50 mg at 10/01/23 0928    mupirocin 2 % ointment, , Nasal, BID, Hero Ambriz MD, Given at 10/01/23 0930    naloxone 0.4 mg/mL injection 0.02 mg, 0.02 mg, Intravenous, PRN, Kellee Dinh AGACNP-BC    ondansetron injection 4 mg, 4 mg, Intravenous, Q4H PRN, Kellee Dinh AGACNP-BC    pantoprazole EC tablet 40 mg, 40 mg, Oral, Daily, Hero Ambriz MD, 40 mg at 10/01/23 0928    polyethylene glycol packet 17 g, 17 g, Oral, BID PRN, Kellee Dinh AGACNP-BC    prochlorperazine injection Soln 5 mg, 5 mg, Intravenous, Q6H PRN, Kellee Dinh, CALLY-BC    senna-docusate 8.6-50 mg per tablet 2 tablet, 2 tablet, Oral, BID, Hero Ambriz MD, 2 tablet at 10/01/23 0928    simethicone chewable tablet 80 mg, 1 tablet, Oral, QID PRN, Kellee Dinh, JOSE-BC    Vital Signs (24 h):  Temp:  [97.7 °F (36.5 °C)-98.4 °F (36.9 °C)] 98.2 °F (36.8 °C)  Pulse:  [] 101  Resp:  [18] 18  SpO2:  [98 %] 98 %  BP: (117-146)/(68-79) 117/73   I/O last 3 completed shifts:  In: 0   Out: 2000 [Urine:1000; Other:1000]  No intake/output data  recorded.        Physical Exam:  General: NAD  HEENT: NC/AT, EOMI  CVS: RRR  RS: breathing easily    Abdominal: Soft, NT/ND.  Extremities: No edema b/l LE  Skin: No rash, no lesions.  Neurological: No focal deficits.  Psych: Normal affect  Dialysis Access: left forearm loop arteriovenous graft with good thrill.    Results:    Lab Results   Component Value Date     09/30/2023    K 3.4 (L) 09/30/2023    CO2 24 09/30/2023    BUN 33.6 (H) 09/30/2023    CREATININE 5.35 (H) 09/30/2023    CALCIUM 8.3 (L) 09/30/2023    PHOS 2.7 09/30/2023    WBC 8.84 09/30/2023    HGB 7.5 (L) 09/30/2023    HCT 24.3 (L) 09/30/2023     09/30/2023       Assessment and Plan:      ESRD on HD via L forearm graft.  Thrombosed graft. -- RESOLVED.  Pt with ESRD on HD via L forearm graft that is thrombosed. Pt had her last HD treatment at an OSH on 9/25/23. She was then found to be thrombosed and transferred to this facility for thrombectomy and dialysis. Pt underwent successful thrombectomy on 9/29/23 and underwent dialysis thereafter.   - Continue HD by TTS schedule.     Anemia.  Pt with anemia likely in setting of ESRD. Hgb is low at 7.5 yesteday AM.  - Check iron studies.  - May need to consider blood transfusion.  - Monitor Hgb.    Thank you for your consult. Please feel free to reach me with any questions.  Plan for follow-up tomorrow.    Cristiano Estevez DO  Interventional Nephrology  Cell: [phone]

## 2023-10-01 NOTE — PLAN OF CARE
Problem: Occupational Therapy  Goal: Occupational Therapy Goal  Description: Goals to be met by: 10/29     Patient will increase functional independence with ADLs by performing:    LE Dressing with Minimal Assistance.  Grooming while seated at sink with Stand-by Assistance.  Toileting from bedside commode with Stand-by Assistance for hygiene and clothing management.   Toilet transfer to bedside commode vis squat pivot vs slideboard with Moderate Assistance.    Outcome: Ongoing, Progressing

## 2023-10-01 NOTE — PT/OT/SLP EVAL
"Occupational Therapy  Evaluation    Name: Alycia Lewis  MRN: [id number]  Admitting Diagnosis: AV fistula thrombosis s/p thrombectomy  Hx: recent R tib/fib fx, ESRD on HD, diabetic neuropathy   Recent Surgery: Procedure(s) (LRB):  THROMBECTOMY (Left) 2 Days Post-Op    Recommendations:     Discharge Recommendations: nursing facility, skilled  Discharge Equipment Recommendations:  to be determined by next level of care  Barriers to discharge:  Other (Comment) (Severity of deficits)    Assessment:     Alycia Lewis is a 52 y.o. female with a medical diagnosis of AV fistula thrombosis s/p thrombectomy Hx: recent R tib/fib fx, ESRD on HD, diabetic neuropathy .  She presents with the following performance deficits affecting function: weakness, impaired functional mobility, impaired endurance, impaired balance, impaired self care skills, decreased lower extremity function, orthopedic precautions.  Pt reported recent hospital stay 2/2 fall resulting in tib/fib fx. Pt reported she is NWB RLE and has been bedbound and dependent for ADLs since her fall 2/2 WB precautions. Pt reported and and her spouse have "a system" at home and he is able to assist as needed. Pt stated she was working on a SNF stay prior to admit. Upon discharge, this patient would benefit from SNF placement.    Rehab Prognosis: Fair; patient would benefit from acute skilled OT services to address these deficits and reach maximum level of function.       Plan:     Patient to be seen 3 x/week to address the above listed problems via self-care/home management, therapeutic activities, therapeutic exercises  Plan of Care Expires: 10/29/23  Plan of Care Reviewed with:      Subjective     Chief Complaint: "I need to be changed"  Patient/Family Comments/goals: To go to SNF     Occupational Profile:  Living Environment: Pt lives c her spouse in a mobile home c a ramp to enter. Reported she is unable to fit her w/c through the doorways. Has a walk " "in shower, but is unable to get into the bathroom.   Previous level of function: Pt reported that since her fall she has been bedbound and dependent for ADLs. Her spouse assists c bed baths, dressing, and toileting using a bedpan.   Equipment Used at Home: walker, rolling, wheelchair, power chair  Assistance upon Discharge: Pt's spouse will be able to assist.     Pain/Comfort:  Pain Rating 1: 0/10    Patients cultural, spiritual, Mu-ism conflicts given the current situation: no    Objective:       Patient was found HOB elevated with peripheral IV upon OT entry to room.    General Precautions: Standard, fall  Orthopedic Precautions: RLE non weight bearing  Braces: N/A      Bed Mobility:    Patient completed Rolling/Turning to Left with  moderate assistance  Patient completed Rolling/Turning to Right with moderate assistance  Patient completed Scooting/Bridging with maximal assistance. Pt reported she is "not a scooter"  Patient completed Supine to Sit with Mod Ax2  Patient completed Sit to Supine with Mod A x2     Functional Mobility/Transfers:  Functional Mobility: Pt declined standing. Stated she will not stand until her NWB precautions are lifted.     Activities of Daily Living:  Grooming: Pt brushed teeth while seated EOB c set up for task    Lower Body Dressing: total assistance    Toileting: maximal assistance for hygiene following +void.       Functional Cognition:  Intact    Visual Perceptual Skills:  Intact    Upper Extremity Function:  Right Upper Extremity:   WFL    Left Upper Extremity:  WFL      Therapeutic Positioning  Risk for acquired pressure injuries is increased due to impaired mobility.    OT interventions performed during the course of today's session:   Education was provided on benefits of and recommendations for therapeutic positioning  Therapeutic positioning was provided at the conclusion of session to offload all bony prominences for the prevention and/or reduction of pressure " injuries    Skin assessment: all bony prominences were assessed    Findings: no redness or breakdown noted    OT recommendations for therapeutic positioning throughout hospitalization:   Follow [facility] Pressure Injury Prevention Protocol  Patient Education:  Patient provided with verbal education education regarding OT role/goals/POC.  Understanding was verbalized.     Patient left HOB elevated with all lines intact and call button in reach    GOALS:   Multidisciplinary Problems       Occupational Therapy Goals          Problem: Occupational Therapy    Goal Priority Disciplines Outcome Interventions   Occupational Therapy Goal     OT, PT/OT Ongoing, Progressing    Description: Goals to be met by: 10/29     Patient will increase functional independence with ADLs by performing:    LE Dressing with Minimal Assistance.  Grooming while seated at sink with Stand-by Assistance.  Toileting from bedside commode with Stand-by Assistance for hygiene and clothing management.   Toilet transfer to bedside commode vis squat pivot vs slideboard with Moderate Assistance.                         History:     Past Medical History:   Diagnosis Date    Transfusion reaction        No past surgical history on file.    Time Tracking:     OT Date of Treatment: 10/29/23  OT Start Time: 1050  OT Stop Time: 1115  OT Total Time (min): 25 min    Billable Minutes:Evaluation Moderate complexity     10/1/2023

## 2023-10-02 VITALS
OXYGEN SATURATION: 98 % | TEMPERATURE: 98 F | DIASTOLIC BLOOD PRESSURE: 81 MMHG | RESPIRATION RATE: 18 BRPM | HEART RATE: 96 BPM | HEIGHT: 67 IN | WEIGHT: 275.44 LBS | SYSTOLIC BLOOD PRESSURE: 152 MMHG | BODY MASS INDEX: 43.23 KG/M2

## 2023-10-02 LAB
ANION GAP SERPL CALC-SCNC: 14 MEQ/L
BASOPHILS # BLD AUTO: 0.06 X10(3)/MCL
BASOPHILS NFR BLD AUTO: 0.6 %
BUN SERPL-MCNC: 33.7 MG/DL (ref 9.8–20.1)
CALCIUM SERPL-MCNC: 8.4 MG/DL (ref 8.4–10.2)
CHLORIDE SERPL-SCNC: 99 MMOL/L (ref 98–107)
CO2 SERPL-SCNC: 24 MMOL/L (ref 22–29)
CREAT SERPL-MCNC: 5.48 MG/DL (ref 0.55–1.02)
CREAT/UREA NIT SERPL: 6
EOSINOPHIL # BLD AUTO: 0.28 X10(3)/MCL (ref 0–0.9)
EOSINOPHIL NFR BLD AUTO: 2.8 %
ERYTHROCYTE [DISTWIDTH] IN BLOOD BY AUTOMATED COUNT: 14.6 % (ref 11.5–17)
GFR SERPLBLD CREATININE-BSD FMLA CKD-EPI: 9 MLS/MIN/1.73/M2
GLUCOSE SERPL-MCNC: 283 MG/DL (ref 74–100)
HBV SURFACE AB SER QL IA: POSITIVE
HBV SURFACE AB SERPL IA-ACNC: 815 MIU/ML
HCT VFR BLD AUTO: 26.6 % (ref 37–47)
HGB BLD-MCNC: 8 G/DL (ref 12–16)
IMM GRANULOCYTES # BLD AUTO: 0.28 X10(3)/MCL (ref 0–0.04)
IMM GRANULOCYTES NFR BLD AUTO: 2.8 %
LYMPHOCYTES # BLD AUTO: 2.22 X10(3)/MCL (ref 0.6–4.6)
LYMPHOCYTES NFR BLD AUTO: 22 %
MCH RBC QN AUTO: 29.9 PG (ref 27–31)
MCHC RBC AUTO-ENTMCNC: 30.1 G/DL (ref 33–36)
MCV RBC AUTO: 99.3 FL (ref 80–94)
MONOCYTES # BLD AUTO: 1.3 X10(3)/MCL (ref 0.1–1.3)
MONOCYTES NFR BLD AUTO: 12.9 %
NEUTROPHILS # BLD AUTO: 5.97 X10(3)/MCL (ref 2.1–9.2)
NEUTROPHILS NFR BLD AUTO: 58.9 %
NRBC BLD AUTO-RTO: 0 %
PLATELET # BLD AUTO: 245 X10(3)/MCL (ref 130–400)
PMV BLD AUTO: 10 FL (ref 7.4–10.4)
POCT GLUCOSE: 358 MG/DL (ref 70–110)
POTASSIUM SERPL-SCNC: 3.8 MMOL/L (ref 3.5–5.1)
RBC # BLD AUTO: 2.68 X10(6)/MCL (ref 4.2–5.4)
SODIUM SERPL-SCNC: 137 MMOL/L (ref 136–145)
WBC # SPEC AUTO: 10.11 X10(3)/MCL (ref 4.5–11.5)

## 2023-10-02 PROCEDURE — 25000003 PHARM REV CODE 250: Performed by: INTERNAL MEDICINE

## 2023-10-02 PROCEDURE — 63600175 PHARM REV CODE 636 W HCPCS: Performed by: NURSE PRACTITIONER

## 2023-10-02 PROCEDURE — 99232 SBSQ HOSP IP/OBS MODERATE 35: CPT | Mod: ,,, | Performed by: STUDENT IN AN ORGANIZED HEALTH CARE EDUCATION/TRAINING PROGRAM

## 2023-10-02 PROCEDURE — 80048 BASIC METABOLIC PNL TOTAL CA: CPT | Performed by: INTERNAL MEDICINE

## 2023-10-02 PROCEDURE — 85025 COMPLETE CBC W/AUTO DIFF WBC: CPT | Performed by: INTERNAL MEDICINE

## 2023-10-02 PROCEDURE — 99232 PR SUBSEQUENT HOSPITAL CARE,LEVL II: ICD-10-PCS | Mod: ,,, | Performed by: STUDENT IN AN ORGANIZED HEALTH CARE EDUCATION/TRAINING PROGRAM

## 2023-10-02 RX ORDER — LEVETIRACETAM 500 MG/1
500 TABLET ORAL 2 TIMES DAILY
Start: 2023-10-02

## 2023-10-02 RX ORDER — LEVETIRACETAM 500 MG/1
500 TABLET ORAL 2 TIMES DAILY
Status: DISCONTINUED | OUTPATIENT
Start: 2023-10-02 | End: 2023-10-02 | Stop reason: HOSPADM

## 2023-10-02 RX ORDER — FOLIC ACID 1 MG/1
1 TABLET ORAL DAILY
Refills: 0
Start: 2023-10-03 | End: 2024-10-02

## 2023-10-02 RX ORDER — ASPIRIN AND DIPYRIDAMOLE 25; 200 MG/1; MG/1
1 CAPSULE, EXTENDED RELEASE ORAL 2 TIMES DAILY
Start: 2023-10-02

## 2023-10-02 RX ADMIN — ATORVASTATIN CALCIUM 20 MG: 10 TABLET, FILM COATED ORAL at 09:10

## 2023-10-02 RX ADMIN — FOLIC ACID 1 MG: 1 TABLET ORAL at 09:10

## 2023-10-02 RX ADMIN — LEVETIRACETAM 500 MG: 500 TABLET, FILM COATED ORAL at 09:10

## 2023-10-02 RX ADMIN — AMLODIPINE BESYLATE 5 MG: 5 TABLET ORAL at 09:10

## 2023-10-02 RX ADMIN — ASPIRIN AND DIPYRIDAMOLE 1 CAPSULE: 25; 200 CAPSULE, EXTENDED RELEASE ORAL at 09:10

## 2023-10-02 RX ADMIN — INSULIN ASPART 10 UNITS: 100 INJECTION, SOLUTION INTRAVENOUS; SUBCUTANEOUS at 11:10

## 2023-10-02 RX ADMIN — METOPROLOL SUCCINATE 50 MG: 50 TABLET, EXTENDED RELEASE ORAL at 09:10

## 2023-10-02 RX ADMIN — PANTOPRAZOLE SODIUM 40 MG: 40 TABLET, DELAYED RELEASE ORAL at 09:10

## 2023-10-02 RX ADMIN — SENNOSIDES AND DOCUSATE SODIUM 2 TABLET: 50; 8.6 TABLET ORAL at 09:10

## 2023-10-02 RX ADMIN — MUPIROCIN: 20 OINTMENT TOPICAL at 09:10

## 2023-10-02 NOTE — PROGRESS NOTES
NEPHROLOGY PROGRESS NOTE       Patient Name: Alycia Nixon Joshua CHAMBERLAIN 1970    Date: 10/2/2023  Time: 10:32 AM      Reason for consult: ESRD/HD management. Thrombosed left forearm AVG.       HPI: 52-year-old female with ESRD on HD via L forearm AVG (followed by Dr. Machado), HTN, HLD, DM2, GIO, and obesity who was transferred to [facility] for thrombosed L forearm and inability to complete dialysis. The pt was admitted at an OSH for uremic encephalopathy and hyperkalemia due to missed dialysis treatments. She underwent 3 days of individual dialysis treatments at that facility starting on 23. She was then found to be thrombosed. Last HD treatment was completed on 23. General surgery was consulted, and determined the pt would require transfer for thrombectomy of graft. Hence interventional nephrology/nephrology services was consulted. Pt underwent successful percutaneous thrombectomy of her graft on 23 and subsequent dialysis.     Interval History: Pt seen and examined at bedside this AM. Denies complaints. Awaiting SNF placement.     Review of Systems:  General:  No fatigue  Skin: No rashes  HEENT: No vision changes  CVS: No CP  RS: No SOB  GIT: No abdominal pain  Extremities: No swelling  Neurological:  No focal weakness  Psych: No depression        Current Facility-Administered Medications:     acetaminophen tablet 650 mg, 650 mg, Oral, Q6H PRN, Kellee Dinh AGACNP-BC    aluminum-magnesium hydroxide-simethicone 200-200-20 mg/5 mL suspension 30 mL, 30 mL, Oral, QID PRN, Kellee Dinh AGACNP-BC    amLODIPine tablet 5 mg, 5 mg, Oral, Daily, Hero Ambriz MD, 5 mg at 10/02/23 0959    atorvastatin tablet 20 mg, 20 mg, Oral, Daily, Hero Ambriz MD, 20 mg at 10/02/23 0944    cyclobenzaprine tablet 10 mg, 10 mg, Oral, TID PRN, Hero Ambriz MD    dextrose 10% bolus 125 mL 125 mL, 12.5 g, Intravenous, PRN, Kellee Dinh AGACNP-BC    dextrose 10% bolus 250 mL 250 mL, 25 g, Intravenous,  PRN, Kellee Dinh AGACNP-BC    dipyridamole-aspirin 200-25 mg per 12 hr capsule 1 capsule, 1 capsule, Oral, BID, Hero Ambriz MD, 1 capsule at 10/02/23 0959    folic acid tablet 1 mg, 1 mg, Oral, Daily, Hero Ambriz MD, 1 mg at 10/02/23 0945    glucagon (human recombinant) injection 1 mg, 1 mg, Intramuscular, PRN, Kellee Dinh AGACNP-BC    insulin aspart U-100 injection 0-10 Units, 0-10 Units, Subcutaneous, Q6H PRN, Kellee Dinh AGACNP-BC, 6 Units at 10/01/23 1649    insulin detemir U-100 injection 25 Units, 25 Units, Subcutaneous, QHS, Hero Ambriz MD, 25 Units at 09/30/23 2140    levETIRAcetam tablet 500 mg, 500 mg, Oral, Daily, Hero Ambriz MD, 500 mg at 10/02/23 0944    melatonin tablet 6 mg, 6 mg, Oral, Nightly PRN, Kellee Dinh AGACNP-BC    metoprolol succinate (TOPROL-XL) 24 hr tablet 50 mg, 50 mg, Oral, Daily, Hero Ambriz MD, 50 mg at 10/02/23 0959    mupirocin 2 % ointment, , Nasal, BID, Hero Ambriz MD, Given at 10/02/23 0945    naloxone 0.4 mg/mL injection 0.02 mg, 0.02 mg, Intravenous, PRN, Kellee Dinh AGACNP-BC    ondansetron injection 4 mg, 4 mg, Intravenous, Q4H PRN, Kellee Dinh AGACNP-BC    pantoprazole EC tablet 40 mg, 40 mg, Oral, Daily, Hero Ambriz MD, 40 mg at 10/02/23 0945    polyethylene glycol packet 17 g, 17 g, Oral, BID PRN, Kellee Dinh AGACNP-BC    prochlorperazine injection Soln 5 mg, 5 mg, Intravenous, Q6H PRN, Kellee Dinh AGACNP-BC    senna-docusate 8.6-50 mg per tablet 2 tablet, 2 tablet, Oral, BID, Hero Ambriz MD, 2 tablet at 10/02/23 0944    simethicone chewable tablet 80 mg, 1 tablet, Oral, QID PRN, Kellee Dinh, AGACNP-BC    Vital Signs (24 h):  Temp:  [98 °F (36.7 °C)-98.7 °F (37.1 °C)] 98 °F (36.7 °C)  Pulse:  [] 101  Resp:  [18-20] 18  SpO2:  [97 %-99 %] 97 %  BP: ()/(51-91) 121/91   I/O last 3 completed shifts:  In: 240 [P.O.:240]  Out: 1000 [Urine:1000]  No intake/output data recorded.        Physical  Exam:  General: NAD  HEENT: NC/AT, EOMI  CVS: RRR  RS: breathing easily    Abdominal: Soft, NT/ND.  Extremities: No edema b/l LE  Skin: No rash, no lesions.  Neurological: No focal deficits.  Psych: Normal affect  Dialysis Access: left forearm loop arteriovenous graft with good thrill.    Results:    Lab Results   Component Value Date     10/02/2023    K 3.8 10/02/2023    CO2 24 10/02/2023    BUN 33.7 (H) 10/02/2023    CREATININE 5.48 (H) 10/02/2023    CALCIUM 8.4 10/02/2023    PHOS 2.7 09/30/2023    WBC 10.11 10/02/2023    HGB 8.0 (L) 10/02/2023    HCT 26.6 (L) 10/02/2023     10/02/2023       Assessment and Plan:      ESRD on HD via L forearm graft.  Thrombosed graft. -- RESOLVED.  Pt with ESRD on HD via L forearm graft that is thrombosed. Pt had her last HD treatment at an OSH on 9/25/23. She was then found to be thrombosed and transferred to this facility for thrombectomy and dialysis. Pt underwent successful thrombectomy on 9/29/23 and underwent dialysis thereafter.   - Tolerated last HD treatment on 9/30/23. Continue HD by TTS schedule tomorrow.     Anemia.  Pt with anemia likely in setting of ESRD. Hgb is low at 8.0 today.  - Check iron studies.  - May need to consider blood transfusion.  - Monitor Hgb.    Thank you for your consult. Please feel free to reach me with any questions.  Plan for follow-up tomorrow.    Cristiano Estevez DO  Interventional Nephrology  Cell: [phone]

## 2023-10-02 NOTE — NURSING
"Patient refuses to take any medicines at this time.  She states, "I go to bed at 7 O'clock, it is to late to be taking medicines.  No one better wake me up again.  If my TV is off I am asleep and I do not want to be woken up for anything until after 4 O'clock in the morning.  I can't wait to get my survey about this hospital, I am going to let them know that you don't allow the patients to get any rest around here.  And Why do the doctors think that they can change my medicine times around and mess up my schedule?  I am going to ask the hospitalist that question in the morning."   "

## 2023-10-02 NOTE — PROGRESS NOTES
Inpatient Nutrition Assessment    Admit Date: 9/29/2023   Total duration of encounter: 3 days     Nutrition Recommendation/Prescription     Continue renal diet.     Communication of Recommendations: reviewed with patient    Nutrition Assessment     Malnutrition Assessment/Nutrition-Focused Physical Exam     Last assessed on 10/2/23:  Malnutrition Level: other (see comments) (does not meet criteria) (10/02/23 1240)  Energy Intake (Malnutrition): other (see comments) (does not meet criteria) (10/02/23 1240)  Weight Loss (Malnutrition): other (see comments) (does not meet criteria) (10/02/23 1240)  Subcutaneous Fat (Malnutrition): other (see comments) (does not meet criteria) (10/02/23 1240)           Muscle Mass (Malnutrition): other (see comments) (does not meet criteria) (10/02/23 1240)                          Fluid Accumulation (Malnutrition): other (see comments) (does not meet criteria) (10/02/23 1240)        A minimum of two characteristics is recommended for diagnosis of either severe or non-severe malnutrition.    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   ESRD on HD   Right tib-fib fracture  Anemia of chronic disease    Relevant Medical History: hypertension, hyperlipidemia, ESRD on home hemodialysis, diabetes mellitus type 2, diabetic neuropathy, thyroid disease, seizure disorder, obstructive sleep apnea, asthma, morbid obesity, glaucoma, right tibia/fibula fracture     Nutrition-Related Medications: folic acid, insulin detemir 20 units, pantoprazole, senna-docusate  Calorie Containing IV Medications: no significant kcals from medications at this time    Nutrition-Related Labs:  10/2: BUN 33.7, Crea 5.48, GFR 9, Gluc 283    Diet/PN Order: DIET  "RENAL ON DIALYSIS  Diet Cardiac  Oral Supplement Order: none  Tube Feeding Order: none  Appetite/Oral Intake: good/% of meals  Factors Affecting Nutritional Intake: none identified  Food/Temple/Cultural Preferences: none reported  Food Allergies: no known food allergies    Wound(s):      Altered Skin Integrity 23 0100 Left posterior;lateral Heel #1 Other (comment)-Tissue loss description: Not applicable .    Last Bowel Movement: 23    Comments    10/2/23: Patient reports good appetite and oral intake. No unintentional wt loss.    Anthropometrics    Height: 5' 7" (170.2 cm)    Last Weight: 125 kg (275 lb 7.4 oz) (23) Weight Method: Bed Scale  BMI (Calculated): 43.1  BMI Classification: obese grade III (BMI >/=40)     Ideal Body Weight (IBW), Female: 135 lb     % Ideal Body Weight, Female (lb): 204.05 %                             Usual Weight Provided By: EMR weight history    Wt Readings from Last 5 Encounters:   23 125 kg (275 lb 7.4 oz)   19 (!) 154.5 kg (340 lb 11.5 oz)     Weight Change(s) Since Admission:  Admit Weight: 125 kg (275 lb 7.4 oz) (23)  .    Estimated Needs    Weight Used For Calorie Calculations: 125 kg (275 lb 9.2 oz)  Energy Calorie Requirements (kcal): 2260 kcal/day (mifflin st jeor x 1.2 SF)  Energy Need Method: Parke-St Jeor  Weight Used For Protein Calculations: 125 kg (275 lb 9.2 oz)  Protein Requirements: 150 g/day (1.2g/kg/d)  Fluid Requirements (mL): urine output + 1000mL  Temp (24hrs), Av.1 °F (36.7 °C), Min:97.7 °F (36.5 °C), Max:98.7 °F (37.1 °C)       Enteral Nutrition    Patient not receiving enteral nutrition at this time.    Parenteral Nutrition    Patient not receiving parenteral nutrition support at this time.    Evaluation of Received Nutrient Intake    Calories: meeting estimated needs  Protein: meeting estimated needs    Patient Education    Not applicable.    Nutrition Diagnosis     PES:  Overweight related to  " suspected excessive energy intake as evidenced by BMI 43. (new)    Interventions/Goals     Intervention(s): general/healthful diet  Goal: Meet greater than 75% of nutritional needs by follow-up. (new)    Monitoring & Evaluation     Dietitian will monitor energy intake.  Nutrition Risk/Follow-Up: moderate (follow-up in 3-5 days)   Please consult if re-assessment needed sooner.

## 2023-10-02 NOTE — PLAN OF CARE
Pt will DC to [facility] today DC packet sent through [facility] awaiting a cb from [place] to k who'll take report

## 2023-10-02 NOTE — DISCHARGE SUMMARY
[facility] - 5th Floor Med Surg  [facility] MEDICINE - DISCHARGE SUMMARY    Patient Name: Alycia Lewis  MRN: [id number]  Admission Date: 9/29/2023  Discharge Date: 10/02/2023  Hospital Length of Stay: 3 days  Discharge Provider: Hero Ambriz MD  Primary Care Provider: Rivka Vazquez NP      HOSPITAL COURSE     Alycia Lewis is a 52 y.o. White female with a past medical history of hypertension, hyperlipidemia, ESRD on home hemodialysis, diabetes mellitus type 2, diabetic neuropathy, thyroid disease, seizure disorder, obstructive sleep apnea not on CPAP, asthma, morbid obesity, glaucoma and right tibia/fibula fracture resulting in admission to [facility] on 9/14/2023 to 9/16/2023. Ortho consulted and no surgical intervention required. Patient was admitted to [facility] on 09/23/2023 acute uremic encephalopathy, hyperkalemia secondary to ESRD on dialysis missing dialysis and acute on chronic anemia requiring transfusion. Nephrology was consulted for dialysis which was done three days in arow with her last dialysis session on 9/25/2023. Patient was noted to have a clotted AV graft in which General surgery was consulted on 09/28/2023 and it was recommended patient be transferred to [facility] for Interventional Nephrology for declotting and possible salvage of graft. Patient presented to [facility] on 9/29/2023 for vascular surgery Services.  On exam patient has no complaints. Patient has continues to be hemodynamically stable.  Labs this morning with H&H 8.2/27.1, MCV 99.3, BUN/creatinine 49/7.50, potassium 3.6, glucose 306.  Patient is admitted to hospital medicine services and further medical management.  Interventional nephrologist performed percutaneous thrombectomy of left arm AV graft September 29.  Tolerating dialysis run.  No new issues.  Plan for discharge to skilled nursing facility today.      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  Refills: 0           CONTINUE these medications which have CHANGED    Details   dipyridamole-aspirin 200-25 mg (AGGRENOX)  mg CM12 Take 1 capsule by mouth 2 (two) times daily.      levETIRAcetam (KEPPRA) 500 MG Tab Take 1 tablet (500 mg total) by mouth 2 (two) times daily. Give postdialysis on dialysis days           CONTINUE these medications which have NOT CHANGED    Details   amLODIPine (NORVASC) 5 MG tablet Take 5 mg by mouth once daily.      docusate sodium (COLACE) 100 MG capsule Take 100 mg by mouth 2 (two) times daily.      metoprolol succinate (TOPROL-XL) 50 MG 24 hr tablet Take 50 mg by mouth once daily.      pantoprazole (PROTONIX) 40 MG tablet Take 40 mg by mouth once daily.      rosuvastatin (CRESTOR) 40 MG Tab Take 10 mg by mouth every evening.      B complex w-C no.20/folic acid (NEPHROCAPS ORAL) Take 1 capsule by mouth once daily.      cyclobenzaprine  (FLEXERIL) 10 MG tablet Take 10 mg by mouth 3 (three) times daily as needed for Muscle spasms.      insulin glargine (LANTUS U-100 INSULIN) 100 unit/mL injection Inject 20 Units into the skin every evening.      semaglutide (OZEMPIC SUBQ) Inject 0.75 mg into the skin once a week.           STOP taking these medications       aspirin (ECOTRIN) 81 MG EC tablet Comments:   Reason for Stopping:                  CONSULTS     Consults (From admission, onward)          Status Ordering Provider     Inpatient consult to Nephrology  Once        Provider:  Cristiano Estevez DO    Completed OPAL ORELLANA     Inpatient consult to Vascular Surgery  Once        Provider:  Heri Hester MD    Completed ISABELLE KYLE              FOLLOW UP      Follow-up Information       Rivka Vazquez NP Follow up in 1 week(s).    Specialty: Nurse Practitioner  Contact information:  [address] [facility] [address]  [facility] [address]  [phone]               Cristiano Estevez DO Follow up.    Specialty: Nephrology  Contact information:  [address]  [facility] [place] [address]  [facility] [id number] [phone]                                 DISCHARGE INSTRUCTIONS     Explained in detail to the patient about the discharge plan, medications, and follow-up visits. Pt understands and agrees with the treatment plan.  Discharged Condition: stable  Diet as tolerated  Activities as tolerated  Discharge to: Skilled Nursing Facility    TIME SPENT ON DISCHARGE   35 minutes        Hero Ambriz MD  Internal Medicine  Department of [facility] Medicine  [facility] - 5th Floor Med Surg      This document was created using electronic dictation services.  Please excuse any errors that may have been made.  Contact me if any questions regarding documentation to clarify verbiage.

## 2023-10-02 NOTE — PLAN OF CARE
10/02/23 1217   Final Note   Assessment Type Final Discharge Note   Anticipated Discharge Disposition SNF  ([facility] (new))   Post-Acute Status   Post-Acute Authorization Placement   Post-Acute Placement Status Set-up Complete/Auth obtained   Coverage Medicare A/B   Discharge Delays None known at this time

## 2023-10-02 NOTE — PROGRESS NOTES
[facility] - 5th Floor Med Surg  [facility] MEDICINE ~ PROGRESS NOTE        CHIEF COMPLAINT   Hospital follow up    HOSPITAL COURSE   Alycia Lewis is a 52 y.o. White female with a past medical history of hypertension, hyperlipidemia, ESRD on home hemodialysis, diabetes mellitus type 2, diabetic neuropathy, thyroid disease, seizure disorder, obstructive sleep apnea not on CPAP, asthma, morbid obesity, glaucoma and right tibia/fibula fracture resulting in admission to [facility] on 9/14/2023 to 9/16/2023. Ortho consulted and no surgical intervention required. Patient was admitted to [facility] on 09/23/2023 acute uremic encephalopathy, hyperkalemia secondary to ESRD on dialysis missing dialysis and acute on chronic anemia requiring transfusion. Nephrology was consulted for dialysis which was done three days in arow with her last dialysis session on 9/25/2023. Patient was noted to have a clotted AV graft in which General surgery was consulted on 09/28/2023 and it was recommended patient be transferred to [facility] for Interventional Nephrology for declotting and possible salvage of graft. Patient presented to [facility] on 9/29/2023 for vascular surgery Services.  On exam patient has no complaints. Patient has continues to be hemodynamically stable.  Labs this morning with H&H 8.2/27.1, MCV 99.3, BUN/creatinine 49/7.50, potassium 3.6, glucose 306.  Patient is admitted to hospital medicine services and further medical management.  Interventional nephrologist performed percutaneous thrombectomy of left arm AV graft September 29.    Today  No new issues.  Doing well.  Had complaints about not receiving her Aggrenox and Keppra at night and nurse brought 25 units of insulin.  Discussed the insulin was long-acting and not short-acting so it is different from what she was thinking.  Keppra was reduced to once daily by [facility] before she came  here.        OBJECTIVE/PHYSICAL EXAM     VITAL SIGNS (MOST RECENT):  Temp: 98 °F (36.7 °C) (10/02/23 0718)  Pulse: 101 (10/02/23 0718)  Resp: 18 (10/02/23 0718)  BP: (!) 121/91 (10/02/23 0959)  SpO2: 97 % (10/02/23 0718) VITAL SIGNS (24 HOUR RANGE):  Temp:  [98 °F (36.7 °C)-98.7 °F (37.1 °C)] 98 °F (36.7 °C)  Pulse:  [] 101  Resp:  [18-20] 18  SpO2:  [97 %-99 %] 97 %  BP: ()/(51-91) 121/91   GENERAL: In no acute distress, afebrile  HEENT:  CHEST: Clear to auscultation bilaterally  HEART: S1, S2, no appreciable murmur  ABDOMEN: Soft, nontender, BS +  MSK: Warm, no lower extremity edema, no clubbing or cyanosis, right lower extremity in a cast  NEUROLOGIC: Alert and oriented x4, moving all extremities with good strength   INTEGUMENTARY:  PSYCHIATRY:        ASSESSMENT/PLAN   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  Tolerating dialysis.  Need some erythropoietin.  She required 1 unit of blood on admission as well.  Case management following.  Medically stable for discharge to SNF.      DVT prophylaxis:     Anticipated discharge and disposition:  Case management working on getting approval from nursing home  __________________________________________________________________________    LABS/MICRO/MEDS/DIAGNOSTICS       LABS  Recent Labs     09/30/23  0517 10/02/23  0515    137   K 3.4* 3.8   CHLORIDE 99 99   CO2 24 24   BUN 33.6* 33.7*   CREATININE 5.35* 5.48*   GLUCOSE 272* 283*   CALCIUM 8.3* 8.4   ALBUMIN 2.0*  --        Recent Labs     10/02/23  0515   WBC 10.11   RBC 2.68*   HCT 26.6*   MCV 99.3*            MICROBIOLOGY  Microbiology Results (last 7 days)       ** No results found for the last 168 hours. **          "      MEDICATIONS   amLODIPine  5 mg Oral Daily    atorvastatin  20 mg Oral Daily    dipyridamole-aspirin 200-25 mg  1 capsule Oral BID    folic acid  1 mg Oral Daily    insulin detemir U-100  20 Units Subcutaneous QHS    levETIRAcetam  500 mg Oral BID    metoprolol succinate  50 mg Oral Daily    mupirocin   Nasal BID    pantoprazole  40 mg Oral Daily    senna-docusate 8.6-50 mg  2 tablet Oral BID         INFUSIONS         DIAGNOSTIC TESTS  X-Ray Tibia Fibula 2 View Right   Final Result      As above.         Electronically signed by: Alvarado Chaudhry   Date:    09/29/2023   Time:    15:47           No results found for: "EF"       NUTRITION STATUS  Patient meets ASPEN criteria for   malnutrition of   per RD assessment as evidenced by:                       A minimum of two characteristics is recommended for diagnosis of either severe or non-severe malnutrition.       Case related differential diagnoses have been reviewed; assessment and plan has been documented. I have personally reviewed the labs and test results that are currently available; I have reviewed the patients medication list. I have reviewed the consulting providers recommendations. I have reviewed or attempted to review medical records based upon their availability.  All of the patient's and/or family's questions have been addressed and answered to the best of my ability.  I will continue to monitor closely and make adjustments to medical management as needed.  This document was created using M*Modal Fluency Direct.  Transcription errors may have been made.  Please contact me if any questions may rise regarding documentation to clarify transcription.        Hero Ambriz MD   Internal Medicine  Department of [facility] Medicine  [facility] - 5th Floor Med Surg               "

## 2023-12-05 DIAGNOSIS — M87.050: Primary | ICD-10-CM

## 2024-01-03 ENCOUNTER — HOSPITAL ENCOUNTER (OUTPATIENT)
Dept: RADIOLOGY | Facility: HOSPITAL | Age: 54
Discharge: HOME | End: 2024-01-03
Attending: PAIN MEDICINE
Payer: MEDICARE

## 2024-01-03 DIAGNOSIS — M87.050: ICD-10-CM

## 2024-01-03 PROCEDURE — 72195 MRI PELVIS W/O DYE: CPT | Mod: TC

## 2024-03-12 ENCOUNTER — TELEPHONE (OUTPATIENT)
Dept: TRANSPLANT | Facility: CLINIC | Age: 54
End: 2024-03-12
Payer: MEDICARE

## 2024-04-04 ENCOUNTER — TELEPHONE (OUTPATIENT)
Dept: TRANSPLANT | Facility: CLINIC | Age: 54
End: 2024-04-04
Payer: MEDICARE

## 2024-04-10 ENCOUNTER — TELEPHONE (OUTPATIENT)
Dept: TRANSPLANT | Facility: CLINIC | Age: 54
End: 2024-04-10
Payer: MEDICARE

## 2024-04-10 DIAGNOSIS — Z76.82 ORGAN TRANSPLANT CANDIDATE: Primary | ICD-10-CM

## 2024-05-23 ENCOUNTER — TELEPHONE (OUTPATIENT)
Dept: TRANSPLANT | Facility: CLINIC | Age: 54
End: 2024-05-23
Payer: MEDICARE

## 2024-05-29 ENCOUNTER — LAB VISIT (OUTPATIENT)
Dept: LAB | Facility: HOSPITAL | Age: 54
End: 2024-05-29
Payer: MEDICARE

## 2024-05-29 ENCOUNTER — TELEPHONE (OUTPATIENT)
Dept: TRANSPLANT | Facility: CLINIC | Age: 54
End: 2024-05-29
Payer: MEDICARE

## 2024-05-29 ENCOUNTER — OFFICE VISIT (OUTPATIENT)
Dept: TRANSPLANT | Facility: CLINIC | Age: 54
End: 2024-05-29
Payer: MEDICARE

## 2024-05-29 VITALS
SYSTOLIC BLOOD PRESSURE: 144 MMHG | DIASTOLIC BLOOD PRESSURE: 77 MMHG | RESPIRATION RATE: 18 BRPM | WEIGHT: 245.56 LBS | HEART RATE: 97 BPM | TEMPERATURE: 98 F | OXYGEN SATURATION: 100 % | BODY MASS INDEX: 38.54 KG/M2 | HEIGHT: 67 IN

## 2024-05-29 DIAGNOSIS — I12.0 BENIGN HYPERTENSION WITH ESRD (END-STAGE RENAL DISEASE): ICD-10-CM

## 2024-05-29 DIAGNOSIS — Z99.2 TYPE 2 DIABETES MELLITUS WITH CHRONIC KIDNEY DISEASE ON CHRONIC DIALYSIS, WITH LONG-TERM CURRENT USE OF INSULIN: ICD-10-CM

## 2024-05-29 DIAGNOSIS — Z99.2 ESRD (END STAGE RENAL DISEASE) ON DIALYSIS: ICD-10-CM

## 2024-05-29 DIAGNOSIS — I25.10 ATHEROSCLEROSIS OF NATIVE CORONARY ARTERY OF NATIVE HEART WITHOUT ANGINA PECTORIS: ICD-10-CM

## 2024-05-29 DIAGNOSIS — I65.29 STENOSIS OF CAROTID ARTERY, UNSPECIFIED LATERALITY: ICD-10-CM

## 2024-05-29 DIAGNOSIS — G89.4 CHRONIC PAIN SYNDROME: ICD-10-CM

## 2024-05-29 DIAGNOSIS — N18.6 BENIGN HYPERTENSION WITH ESRD (END-STAGE RENAL DISEASE): ICD-10-CM

## 2024-05-29 DIAGNOSIS — G40.909 SEIZURE DISORDER: ICD-10-CM

## 2024-05-29 DIAGNOSIS — N18.6 ANEMIA IN CHRONIC KIDNEY DISEASE, ON CHRONIC DIALYSIS: ICD-10-CM

## 2024-05-29 DIAGNOSIS — Z79.4 TYPE 2 DIABETES MELLITUS WITH CHRONIC KIDNEY DISEASE ON CHRONIC DIALYSIS, WITH LONG-TERM CURRENT USE OF INSULIN: ICD-10-CM

## 2024-05-29 DIAGNOSIS — Z76.82 ORGAN TRANSPLANT CANDIDATE: ICD-10-CM

## 2024-05-29 DIAGNOSIS — E66.01 CLASS 2 SEVERE OBESITY DUE TO EXCESS CALORIES WITH SERIOUS COMORBIDITY AND BODY MASS INDEX (BMI) OF 38.0 TO 38.9 IN ADULT: ICD-10-CM

## 2024-05-29 DIAGNOSIS — N18.6 ESRD (END STAGE RENAL DISEASE) ON DIALYSIS: ICD-10-CM

## 2024-05-29 DIAGNOSIS — Z99.2 ANEMIA IN CHRONIC KIDNEY DISEASE, ON CHRONIC DIALYSIS: ICD-10-CM

## 2024-05-29 DIAGNOSIS — E11.22 TYPE 2 DIABETES MELLITUS WITH CHRONIC KIDNEY DISEASE ON CHRONIC DIALYSIS, WITH LONG-TERM CURRENT USE OF INSULIN: ICD-10-CM

## 2024-05-29 DIAGNOSIS — Z01.818 PRE-TRANSPLANT EVALUATION FOR CHRONIC KIDNEY DISEASE: Primary | ICD-10-CM

## 2024-05-29 DIAGNOSIS — E78.00 HYPERCHOLESTEROLEMIA: ICD-10-CM

## 2024-05-29 DIAGNOSIS — D63.1 ANEMIA IN CHRONIC KIDNEY DISEASE, ON CHRONIC DIALYSIS: ICD-10-CM

## 2024-05-29 DIAGNOSIS — N18.6 TYPE 2 DIABETES MELLITUS WITH CHRONIC KIDNEY DISEASE ON CHRONIC DIALYSIS, WITH LONG-TERM CURRENT USE OF INSULIN: ICD-10-CM

## 2024-05-29 DIAGNOSIS — Z91.81 HISTORY OF FALLING: ICD-10-CM

## 2024-05-29 PROBLEM — I12.9 HYPERTENSIVE RENAL DISEASE: Status: ACTIVE | Noted: 2021-07-27

## 2024-05-29 PROBLEM — K59.00 CONSTIPATION: Status: ACTIVE | Noted: 2024-05-29

## 2024-05-29 PROBLEM — G47.39 OTHER SLEEP APNEA: Status: ACTIVE | Noted: 2023-09-26

## 2024-05-29 PROBLEM — E89.0 HISTORY OF PARTIAL THYROIDECTOMY: Status: ACTIVE | Noted: 2024-05-29

## 2024-05-29 PROBLEM — N18.9 ANEMIA IN CHRONIC KIDNEY DISEASE: Status: ACTIVE | Noted: 2022-05-19

## 2024-05-29 PROBLEM — M54.16 RADICULOPATHY, LUMBAR REGION: Status: ACTIVE | Noted: 2023-10-02

## 2024-05-29 PROBLEM — E66.812 CLASS 2 SEVERE OBESITY DUE TO EXCESS CALORIES WITH SERIOUS COMORBIDITY AND BODY MASS INDEX (BMI) OF 38.0 TO 38.9 IN ADULT: Status: ACTIVE | Noted: 2024-05-29

## 2024-05-29 PROBLEM — K21.9 GASTROESOPHAGEAL REFLUX DISEASE: Status: ACTIVE | Noted: 2024-05-29

## 2024-05-29 PROBLEM — M51.36 DDD (DEGENERATIVE DISC DISEASE), LUMBAR: Status: ACTIVE | Noted: 2024-05-29

## 2024-05-29 PROBLEM — G47.09 OTHER INSOMNIA: Status: ACTIVE | Noted: 2023-10-11

## 2024-05-29 PROBLEM — M51.369 DDD (DEGENERATIVE DISC DISEASE), LUMBAR: Status: ACTIVE | Noted: 2024-05-29

## 2024-05-29 LAB
A1 AG RBC QL: NORMAL
ABO + RH BLD: NORMAL
ALBUMIN SERPL BCP-MCNC: 2.5 G/DL (ref 3.5–5.2)
ALP SERPL-CCNC: 88 U/L (ref 55–135)
ALT SERPL W/O P-5'-P-CCNC: <5 U/L (ref 10–44)
ANION GAP SERPL CALC-SCNC: 10 MMOL/L (ref 8–16)
APTT PPP: 23.7 SEC (ref 21–32)
AST SERPL-CCNC: 8 U/L (ref 10–40)
BASOPHILS # BLD AUTO: 0.03 K/UL (ref 0–0.2)
BASOPHILS NFR BLD: 0.3 % (ref 0–1.9)
BILIRUB DIRECT SERPL-MCNC: 0.2 MG/DL (ref 0.1–0.3)
BILIRUB SERPL-MCNC: 0.5 MG/DL (ref 0.1–1)
BLD GP AB SCN CELLS X3 SERPL QL: NORMAL
BUN SERPL-MCNC: 26 MG/DL (ref 6–20)
CALCIUM SERPL-MCNC: 8.2 MG/DL (ref 8.7–10.5)
CHLORIDE SERPL-SCNC: 104 MMOL/L (ref 95–110)
CHOLEST SERPL-MCNC: 120 MG/DL (ref 120–199)
CHOLEST/HDLC SERPL: 2.4 {RATIO} (ref 2–5)
CO2 SERPL-SCNC: 26 MMOL/L (ref 23–29)
CREAT SERPL-MCNC: 3 MG/DL (ref 0.5–1.4)
DIFFERENTIAL METHOD BLD: ABNORMAL
EOSINOPHIL # BLD AUTO: 0 K/UL (ref 0–0.5)
EOSINOPHIL NFR BLD: 0.1 % (ref 0–8)
ERYTHROCYTE [DISTWIDTH] IN BLOOD BY AUTOMATED COUNT: 12.8 % (ref 11.5–14.5)
EST. GFR  (NO RACE VARIABLE): 17.8 ML/MIN/1.73 M^2
ESTIMATED AVG GLUCOSE: 105 MG/DL (ref 68–131)
GLUCOSE SERPL-MCNC: 232 MG/DL (ref 70–110)
HAV IGG SER QL IA: NORMAL
HBA1C MFR BLD: 5.3 % (ref 4–5.6)
HBV CORE AB SERPL QL IA: NORMAL
HBV SURFACE AB SER-ACNC: >1000 MIU/ML
HBV SURFACE AB SER-ACNC: REACTIVE M[IU]/ML
HBV SURFACE AG SERPL QL IA: NORMAL
HCG INTACT+B SERPL-ACNC: 4.3 MIU/ML
HCT VFR BLD AUTO: 27.6 % (ref 37–48.5)
HCV AB SERPL QL IA: NORMAL
HDLC SERPL-MCNC: 50 MG/DL (ref 40–75)
HDLC SERPL: 41.7 % (ref 20–50)
HGB BLD-MCNC: 8.4 G/DL (ref 12–16)
HIV 1+2 AB+HIV1 P24 AG SERPL QL IA: NORMAL
IMM GRANULOCYTES # BLD AUTO: 0.06 K/UL (ref 0–0.04)
IMM GRANULOCYTES NFR BLD AUTO: 0.6 % (ref 0–0.5)
INR PPP: 1 (ref 0.8–1.2)
LDLC SERPL CALC-MCNC: 52.4 MG/DL (ref 63–159)
LYMPHOCYTES # BLD AUTO: 1.3 K/UL (ref 1–4.8)
LYMPHOCYTES NFR BLD: 13.6 % (ref 18–48)
MCH RBC QN AUTO: 30.5 PG (ref 27–31)
MCHC RBC AUTO-ENTMCNC: 30.4 G/DL (ref 32–36)
MCV RBC AUTO: 100 FL (ref 82–98)
MONOCYTES # BLD AUTO: 0.6 K/UL (ref 0.3–1)
MONOCYTES NFR BLD: 6.4 % (ref 4–15)
NEUTROPHILS # BLD AUTO: 7.3 K/UL (ref 1.8–7.7)
NEUTROPHILS NFR BLD: 79 % (ref 38–73)
NONHDLC SERPL-MCNC: 70 MG/DL
NRBC BLD-RTO: 0 /100 WBC
PHOSPHATE SERPL-MCNC: 3.3 MG/DL (ref 2.7–4.5)
PLATELET # BLD AUTO: 226 K/UL (ref 150–450)
PMV BLD AUTO: 10.9 FL (ref 9.2–12.9)
POTASSIUM SERPL-SCNC: 4 MMOL/L (ref 3.5–5.1)
PROT SERPL-MCNC: 6.5 G/DL (ref 6–8.4)
PROTHROMBIN TIME: 10.6 SEC (ref 9–12.5)
PTH-INTACT SERPL-MCNC: 277.5 PG/ML (ref 9–77)
RBC # BLD AUTO: 2.75 M/UL (ref 4–5.4)
RPR SER QL: NORMAL
SODIUM SERPL-SCNC: 140 MMOL/L (ref 136–145)
SPECIMEN OUTDATE: NORMAL
TRIGL SERPL-MCNC: 88 MG/DL (ref 30–150)
WBC # BLD AUTO: 9.25 K/UL (ref 3.9–12.7)

## 2024-05-29 PROCEDURE — 86833 HLA CLASS II HIGH DEFIN QUAL: CPT | Mod: TXP | Performed by: NURSE PRACTITIONER

## 2024-05-29 PROCEDURE — 83970 ASSAY OF PARATHORMONE: CPT | Mod: TXP | Performed by: NURSE PRACTITIONER

## 2024-05-29 PROCEDURE — 99999 PR PBB SHADOW E&M-EST. PATIENT-LVL V: CPT | Mod: PBBFAC,TXP,, | Performed by: NURSE PRACTITIONER

## 2024-05-29 PROCEDURE — 86682 HELMINTH ANTIBODY: CPT | Mod: TXP | Performed by: NURSE PRACTITIONER

## 2024-05-29 PROCEDURE — 99215 OFFICE O/P EST HI 40 MIN: CPT | Mod: S$GLB,,, | Performed by: NURSE PRACTITIONER

## 2024-05-29 PROCEDURE — 86832 HLA CLASS I HIGH DEFIN QUAL: CPT | Mod: TXP | Performed by: NURSE PRACTITIONER

## 2024-05-29 PROCEDURE — 3008F BODY MASS INDEX DOCD: CPT | Mod: CPTII,S$GLB,, | Performed by: NURSE PRACTITIONER

## 2024-05-29 PROCEDURE — 1160F RVW MEDS BY RX/DR IN RCRD: CPT | Mod: CPTII,S$GLB,, | Performed by: NURSE PRACTITIONER

## 2024-05-29 PROCEDURE — 86644 CMV ANTIBODY: CPT | Mod: TXP | Performed by: NURSE PRACTITIONER

## 2024-05-29 PROCEDURE — 85025 COMPLETE CBC W/AUTO DIFF WBC: CPT | Mod: TXP | Performed by: NURSE PRACTITIONER

## 2024-05-29 PROCEDURE — 81376 HLA II TYPING 1 LOCUS LR: CPT | Mod: 59 | Performed by: NURSE PRACTITIONER

## 2024-05-29 PROCEDURE — 82248 BILIRUBIN DIRECT: CPT | Mod: TXP | Performed by: NURSE PRACTITIONER

## 2024-05-29 PROCEDURE — 99204 OFFICE O/P NEW MOD 45 MIN: CPT | Mod: S$GLB,,, | Performed by: PHYSICIAN ASSISTANT

## 2024-05-29 PROCEDURE — 85730 THROMBOPLASTIN TIME PARTIAL: CPT | Mod: TXP | Performed by: NURSE PRACTITIONER

## 2024-05-29 PROCEDURE — 3044F HG A1C LEVEL LT 7.0%: CPT | Mod: CPTII,S$GLB,, | Performed by: NURSE PRACTITIONER

## 2024-05-29 PROCEDURE — 87389 HIV-1 AG W/HIV-1&-2 AB AG IA: CPT | Mod: TXP | Performed by: NURSE PRACTITIONER

## 2024-05-29 PROCEDURE — 84100 ASSAY OF PHOSPHORUS: CPT | Mod: TXP | Performed by: NURSE PRACTITIONER

## 2024-05-29 PROCEDURE — 86480 TB TEST CELL IMMUN MEASURE: CPT | Mod: TXP | Performed by: NURSE PRACTITIONER

## 2024-05-29 PROCEDURE — 80053 COMPREHEN METABOLIC PANEL: CPT | Mod: TXP | Performed by: NURSE PRACTITIONER

## 2024-05-29 PROCEDURE — 3077F SYST BP >= 140 MM HG: CPT | Mod: CPTII,S$GLB,, | Performed by: NURSE PRACTITIONER

## 2024-05-29 PROCEDURE — 83036 HEMOGLOBIN GLYCOSYLATED A1C: CPT | Mod: TXP | Performed by: NURSE PRACTITIONER

## 2024-05-29 PROCEDURE — 86706 HEP B SURFACE ANTIBODY: CPT | Mod: 91,TXP | Performed by: NURSE PRACTITIONER

## 2024-05-29 PROCEDURE — 1159F MED LIST DOCD IN RCRD: CPT | Mod: CPTII,S$GLB,, | Performed by: NURSE PRACTITIONER

## 2024-05-29 PROCEDURE — 86790 VIRUS ANTIBODY NOS: CPT | Mod: TXP | Performed by: NURSE PRACTITIONER

## 2024-05-29 PROCEDURE — 81373 HLA I TYPING 1 LOCUS LR: CPT | Mod: 59 | Performed by: NURSE PRACTITIONER

## 2024-05-29 PROCEDURE — 87340 HEPATITIS B SURFACE AG IA: CPT | Mod: TXP | Performed by: NURSE PRACTITIONER

## 2024-05-29 PROCEDURE — 81376 HLA II TYPING 1 LOCUS LR: CPT | Performed by: NURSE PRACTITIONER

## 2024-05-29 PROCEDURE — 86787 VARICELLA-ZOSTER ANTIBODY: CPT | Mod: TXP | Performed by: NURSE PRACTITIONER

## 2024-05-29 PROCEDURE — 86592 SYPHILIS TEST NON-TREP QUAL: CPT | Mod: TXP | Performed by: NURSE PRACTITIONER

## 2024-05-29 PROCEDURE — 80061 LIPID PANEL: CPT | Mod: TXP | Performed by: NURSE PRACTITIONER

## 2024-05-29 PROCEDURE — 84702 CHORIONIC GONADOTROPIN TEST: CPT | Mod: TXP | Performed by: NURSE PRACTITIONER

## 2024-05-29 PROCEDURE — 86901 BLOOD TYPING SEROLOGIC RH(D): CPT | Mod: TXP | Performed by: NURSE PRACTITIONER

## 2024-05-29 PROCEDURE — 86803 HEPATITIS C AB TEST: CPT | Mod: TXP | Performed by: NURSE PRACTITIONER

## 2024-05-29 PROCEDURE — 86905 BLOOD TYPING RBC ANTIGENS: CPT | Mod: TXP | Performed by: NURSE PRACTITIONER

## 2024-05-29 PROCEDURE — 85610 PROTHROMBIN TIME: CPT | Mod: TXP | Performed by: NURSE PRACTITIONER

## 2024-05-29 PROCEDURE — 99204 OFFICE O/P NEW MOD 45 MIN: CPT | Mod: S$GLB,,, | Performed by: TRANSPLANT SURGERY

## 2024-05-29 PROCEDURE — 3078F DIAST BP <80 MM HG: CPT | Mod: CPTII,S$GLB,, | Performed by: NURSE PRACTITIONER

## 2024-05-29 PROCEDURE — 86704 HEP B CORE ANTIBODY TOTAL: CPT | Mod: TXP | Performed by: NURSE PRACTITIONER

## 2024-05-29 PROCEDURE — 81373 HLA I TYPING 1 LOCUS LR: CPT | Performed by: NURSE PRACTITIONER

## 2024-05-29 RX ORDER — INSULIN LISPRO 100 [IU]/ML
INJECTION, SOLUTION INTRAVENOUS; SUBCUTANEOUS
COMMUNITY

## 2024-05-29 RX ORDER — METOPROLOL TARTRATE 50 MG/1
50 TABLET ORAL 2 TIMES DAILY
COMMUNITY

## 2024-05-29 RX ORDER — CALCITRIOL 0.25 UG/1
0.25 CAPSULE ORAL DAILY
COMMUNITY

## 2024-05-29 RX ORDER — OXYBUTYNIN CHLORIDE 5 MG/1
5 TABLET, EXTENDED RELEASE ORAL NIGHTLY
COMMUNITY
Start: 2024-04-07

## 2024-05-29 NOTE — PROGRESS NOTES
"   Transplant Nephrology  Kidney Transplant Recipient Evaluation    Referring Physician: Rafi Machado  Current Nephrologist: Rafi Machado    Subjective:   CC:  Initial evaluation of kidney transplant candidacy.    HPI:  Ms. Lewis is a 53 y.o. year old White female who has presented to be evaluated as a potential kidney transplant recipient.  She has ESRD secondary to diabetic nephropathy.  Patient is currently 8/15/22. She has a dialysis catheter -right chest, for dialysis access.  Previous Transplant: no  Recently had steal syndrome to left arm.   AC: Aggrenox       HX seizures; Pt reports she was diagnosed ~ 5-6 years ago with "narrowing arteries in the brain " contributing to the SZ   Has been on Aggrenox since   Last SZ: ~ 2-3 years ago  Meds: Keppra   Follows with Neuro:  Dr Arnold Mccartney   [facility] multiple falls and LE fractures requiring surgery  Report last fall in 9/2023 , fx right ankle--requiring surgery and plates/screws  10/1/2023 : OT NOTE: Since her fall she has been bedbound and dependent for ADLs. Her spouse assists   bed baths, dressing, and toileting using a bedpan.   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    She continues to have LE weakness, use WC, can not walk  ANY distance.  She lives in a Mobile home and uses a walker.   She needs assistance standing and getting on and Off the toilet, bathing etc. Pt reports feeling unstable when walking with her walker and is afraid she will fall.   -Discussed getting a referral for PT to build upper and lower Muscle strength and increase stamina.      DM 2  Meds: Lantus,  Lispro, Ozempic   Lab Results   Component Value Date    HGBA1C 5.3 05/29/2024   Diabetes: Type II   Onset of Diabetes: 20s    Complications: Retinopathy: " yes, Neuropathy: no , Gastroparesis: Unknown   Hypoglycemic unawareness: no  Amputation: no  Symptomatic Peripheral Vascular Disease: unknown  Diabetic foot ulcers: denies     Cardiac HX: HTN, stenosis carotid artery    Cardiologist  Dr Gagnon at [facility] in [place]     Body mass index is 38.64 kg/m².  Since starting Ozempic ~ 1 year ago; has lost ~ 70 lbs and cont to lose weight  Encourage lifestyle modifications: diet, exercise, weight loss   Needs to maintain acceptable BMI for txp/guidelines     Donor:    is interested in donating     Kidney bx: [facility]  ~ 8 years ago       Past Medical and Surgical History: Ms. Lewis  has a past medical history of Anemia, Coronary artery disease, Diabetes mellitus, Diabetes mellitus, type 2, Disorder of kidney and ureter, Hyperlipidemia, Hypertension, Narrowing of both carotid arteries, Obesity, Renal disorder, Seizures, Sleep apnea, unspecified, Thyroid disease, and Transfusion reaction.  She has a past surgical history that includes Thrombectomy (Left, 09/29/2023); Hysterectomy; Cholecystectomy; Appendectomy; Knee arthroscopy (Left); Shoulder surgery; Eye surgery; Thyroid surgery; Excision heel spur excision; and Spine surgery.    Past Social and Family History: Ms. Lewis reports that she has never smoked. She has never used smokeless tobacco. She reports that she does not currently use alcohol. She reports that she does not use drugs. Her family history includes Alzheimer's disease in her mother; Diabetes in her brother; Heart disease in her father; Hyperlipidemia in her brother; Hypertension in her brother; Kidney disease in her mother.      Review of Systems   Constitutional:  Positive for fatigue.   Eyes:  Positive for visual disturbance (wears glasses, diabetic retinopathy).   Respiratory:  Positive for apnea (HX GIO).    Gastrointestinal:  Positive for abdominal distention and constipation.   Genitourinary:         Still makes a  "good amount of urine    Musculoskeletal:  Positive for arthralgias, back pain and gait problem.   Neurological:  Positive for seizures (on Keppra) and weakness.        Peripheral neuropathy    Hematological:  Bruises/bleeds easily.   Psychiatric/Behavioral:  Positive for sleep disturbance.        Objective:   Blood pressure (!) 144/77, pulse 97, temperature 97.7 °F (36.5 °C), temperature source Tympanic, resp. rate 18, height 5' 6.85" (1.698 m), weight 111.4 kg (245 lb 9.5 oz), SpO2 100%.body mass index is 38.64 kg/m².    Physical Exam  Constitutional:       Appearance: Normal appearance. She is well-developed. She is obese.   HENT:      Head: Normocephalic.      Nose: Nose normal.   Eyes:      Conjunctiva/sclera: Conjunctivae normal.      Pupils: Pupils are equal, round, and reactive to light.   Cardiovascular:      Rate and Rhythm: Normal rate and regular rhythm.      Heart sounds: Normal heart sounds.   Pulmonary:      Effort: Pulmonary effort is normal.      Breath sounds: Normal breath sounds.   Chest:       Abdominal:      General: Bowel sounds are normal. There is distension.      Palpations: Abdomen is soft. There is no hepatomegaly or splenomegaly.      Comments: Large pannus   Musculoskeletal:      Cervical back: Normal range of motion and neck supple.      Right lower leg: Edema present.      Left lower leg: Edema present.      Comments: Bilateral trace peripheral edema   Skin:     General: Skin is warm and dry.   Neurological:      Mental Status: She is alert and oriented to person, place, and time.      Motor: Weakness present.      Gait: Gait abnormal.   Psychiatric:         Behavior: Behavior normal.         Labs:  Lab Results   Component Value Date    WBC 9.25 05/29/2024    HGB 8.4 (L) 05/29/2024    HCT 27.6 (L) 05/29/2024     05/29/2024    K 4.0 05/29/2024     05/29/2024    CO2 26 05/29/2024    BUN 26 (H) 05/29/2024    CREATININE 3.0 (H) 05/29/2024    EGFRNORACEVR 17.8 (A) 05/29/2024    " "GLUCOSE 124 (H) 10/24/2023    CALCIUM 8.2 (L) 05/29/2024    PHOS 3.3 05/29/2024    MG 2.20 09/29/2023    ALBUMIN 2.5 (L) 05/29/2024    AST 8 (L) 05/29/2024    ALT <5 (L) 05/29/2024    .5 (H) 05/29/2024       No results found for: "PREALBUMIN", "BILIRUBINUA", "GGT", "AMYLASE", "LIPASE", "PROTEINUA", "NITRITE", "RBCUA", "WBCUA"    No results found for: "HLAABCTYPE"    Labs were reviewed with the patient.    Assessment:     1. Pre-transplant evaluation for chronic kidney disease    2. ESRD (end stage renal disease) on dialysis    3. Type 2 diabetes mellitus with chronic kidney disease on chronic dialysis, with long-term current use of insulin    4. Benign hypertension with ESRD (end-stage renal disease)    5. Atherosclerosis of native coronary artery of native heart without angina pectoris    6. Chronic pain syndrome    7. Seizure disorder    8. Stenosis of carotid artery, unspecified laterality    9. Anemia in chronic kidney disease, on chronic dialysis    10. History of falling    11. Hypercholesterolemia    12. 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    She continues to have LE weakness, use WC, can not walk  ANY distance.  She lives in a Mobile home and uses a walker.   She needs assistance standing and getting on and Off the toilet, bathing etc. Pt reports feeling unstable when walking with her walker and is afraid she will fall.   -Discussed getting a referral for PT to build upper and lower Muscle strength and increase stamina.  Deferred -Can be referred once completing PT increasing muscle strength and stamina   -add  6 minute walk     -While working on Muscle strength and stamina;   -Discussed getting UTD MMG and GYN; s/p total hysterectomy     When " returns to clinic will need the following to complete th wk up:    cardiology clearance: HX HTN, HLD, carotid artery stenosis:    -UTD cardiac testing   -add  bilateral carotid US   - follows with Cardiologist  Dr Gagnon at [facility] in [place]     Neurology clearance: HX Seizures on Keppra , clarification aggrenox needs   Follows with Neuro:  Dr Arnold Mccartney  at the [facility]   -Kidney bx: [facility]--get report ~ 8 years ago   -Last Colonoscopy at [facility] ~ 2-3 years ago --will need to get report      Transplant Candidacy:   Based on available information, Ms. Lewis is an unacceptable kidney transplant candidate d/t general weakness/ frailty.   Meets center eligibility for accepting HCV+ donor offer - Yes.  Patient educated on HCV+ donors. Alycia is willing to accept HCV+ donor offer - Yes   Patient is a candidate for KDPI > 85 kidney donor offer - No d/t weight   Final determination of transplant candidacy will be made once workup is complete and reviewed by the selection committee.    Patient advised that it is recommended that all transplant candidates, and their close contacts and household members receive Covid vaccination.    UNOS Patient Status  Functional Status: 60% - Requires occasional assistance but is able to care for needs       Ngozi Buitrago NP

## 2024-05-29 NOTE — TELEPHONE ENCOUNTER
Reviewed pt transplant labs.  Notified dialysis unit dietitian of the following abnormal labs via fax and requested their most recent nutrition note on this pt.  Once this note is received it will be scanned into pt's chart.     Glucose 232  Calcium 8.2  Albumin 2.5

## 2024-05-29 NOTE — PROGRESS NOTES
Transplant Surgery  Kidney Transplant Recipient Evaluation    Referring Physician: Rafi Machado  Current Nephrologist: Rafi Machado    Subjective:     Reason for Visit: evaluate transplant candidacy    History of Present Illness: Alycia Lewis is a 53 y.o. year old female undergoing transplant evaluation.    Dialysis History: Alycia is on hemodialysis.      Transplant History: N/A    Etiology of Renal Disease: Diabetes Mellitus - Type II (based on medical records from referral).    External provider notes reviewed: Yes    Review of Systems  Objective:     Physical Exam:  Constitutional:   Vitals reviewed: yes   Well-nourished and well-groomed: no, obese, strong odor in the room  Eyes:   Sclerae icteric: no   Extraocular movements intact: yes  GI:    Bowel sounds normal: yes   Tenderness: no    If yes, quadrant/location: not applicable   Palpable masses: no    If yes, quadrant/location: not applicable   Hepatosplenomegaly: no   Ascites: no   Hernia: no    If yes, type/location: not applicable   Surgical scars: yes jae, appy, hysterectomy    If yes, type/location: not applicable  Resp:   Effort normal: yes   Breath sounds normal: yes    CV:   Regular rate and rhythm: yes   Heart sounds normal: yes   Femoral pulses normal: no   Extremities edematous: yes  Skin:   Rashes or lesions present: no    If yes, describe:not applicable   Jaundice:: no    Musculoskeletal:   Gait normal: yes   Strength normal: yes  Psych:   Oriented to person, place, and time: yes   Affect and mood normal: yes    Additional comments:  severe abdominal obesity; large pannus    Diagnostics:  The following labs have been reviewed: CBC  CMP  INR  The following radiology images have been independently reviewed and interpreted: pending    Counseling: We provided Alycia Lewis with a group education session today.  We discussed kidney transplantation at length with her, including risks, potential complications, and alternatives in the  management of her renal failure.  The discussion included complications related to anesthesia, bleeding, infection, primary nonfunction, and ATN.  I discussed the typical postoperative course, length of hospitalization, the need for long-term immunosuppression, and the need for long-term routine follow-up.  I discussed living-donor and -donor transplantation and the relative advantages and disadvantages of each.  I also discussed average waiting times for both living donation and  donation.  I discussed national and center-specific survival rates.  I also mentioned the potential benefit of multicenter listing to candidates listed with centers within more than one organ procurement organization.  All questions were answered.    Patient advised that it is recommended that all transplant candidates, and their close contacts and household members receive Covid vaccination.    Final determination of transplant candidacy will be made once evaluation is complete and reviewed by the Kidney & Kidney/Pancreas Selection Committee.    Coronavirus disease (COVID-19) caused by severe acute respiratory virus coronavirus 2 (SARS-C0V 2) is associated with increased mortality in solid organ transplant recipients (SOT) compared to non-transplant patients. Vaccine responses to vaccination are depressed against SARS-CoV2 compared to normal individuals but improve with third vaccination doses. Vaccination prior to SOT provides both the best opportunity for transplant candidates to develop protective immunity and to reduce the risk of serious COVID19 infections post transplantation. Organ transplant candidates at [facility] will be required to receive SARS-CoV-2 vaccination prior to being listed with a an active status, whenever possible. Exceptions will be made for disability related reasons or for sincerely held [employer] beliefs.          Transplant Surgery - Candidacy    Assessment/Plan:   Alycia Lewis has end stage renal disease (ESRD) on dialysis. I have concerns with poor mobility due to obesity and arthritis. Based on available information, Alycia Lewis is a marginal kidney transplant candidate.     Additional testing to be completed according to the Written Order Guidelines for Adult Pre-kidney and Pancreas Transplant Evaluation (KI-02).  Interpretation of tests and discussion of patient management involves all members of the multidisciplinary transplant team.    Sanjeev Sebastian Jr, MD

## 2024-05-29 NOTE — PROGRESS NOTES
Transplant Recipient Adult Psychosocial Assessment    Alycia Lewis  [address]  [place] [address]  Telephone Information:   Mobile [phone]   Home  [phone] (home)  Work  There is no work phone number on file.  E-mail  [email or web address]    Sex: female  YOB: 1970  Age: 53 y.o.    Encounter Date: 2024  [place] Citizen: yes  Primary Language: English   Needed: no    Emergency Contact:  Name: Sadiq Lewis  Relationship:   Address: same as pt  Phone Numbers:[phone] (mobile)    Family/Social Support:   Number of dependents/: pt reports no minor dependents. Pt does have 3 dogs.   Marital history: Pt reports 1 previous marriage and current marriage to Sadiq.  Other family dynamics: Pt presents with supportive  Sadiq. Pt resides with  and 3 dogs. Pt reports parents laura  and 3 siblings (pt only speaks with 1 brother).    Household Composition:  Pt and  resides together.      Do you and your caregivers have access to reliable transportation? yes Pt drives but  does  not drive.  PRIMARY CAREGIVER: Sadiq Lewis will be primary caregiver, phone number [phone].      provided in-depth information to patient and caregiver regarding pre- and post-transplant caregiver role.   strongly encourages patient and caregiver to have concrete plan regarding post-transplant care giving, including back-up caregiver(s) to ensure care giving needs are met as needed.    Patient and Caregiver states understanding all aspects of caregiver role/commitment and is able/willing/committed to being caregiver to the fullest extent necessary.    Patient and Caregiver verbalizes understanding of the education provided today and caregiver responsibilities.         remains available. Patient and Caregiver agree to contact  in a timely manner if concerns arise.      Able to take  "time off work without financial concerns: yes.     Additional Significant Others who will Assist with Transplant:    Pt reports plans to solidify transportation plan "soon" and will contact txp team.      Living Will: yes  Healthcare Power of : yes  Advance Directives on file: <<no information> per medical record.  Verbally reviewed LW/HCPA information.   provided patient with copy of LW/HCPA documents and provided education on completion of forms.    Living Donors: No. Education and resource information given to patient.           Transplant Social Work - Candidacy  Assessment/Plan:     Psychosocial Suitability: Patient presents as an unacceptable candidate for transplant at this time. Based on psychosocial risk factors, patient presents as  a high risk candidate due to lack of transportation plan . Pt's  does not drive. Pt is currently affording all medical needs and has suitable adherence.    Recommendations/Additional Comments: Pt will need to confirm transportation plan for post care.SW recommends that pt conduct fundraising to assist pt with pay for cost of medications, food, gas, and other transplant related needs. SW recommends that pt remain aware of potential mental health concerns and contact the team if any concerns arise. SW recommends that pt remain abstinent from tobacco, ETOH, and drug use. SW supports pt's continued dialysis adherence. SW remains available to answer any questions or concerns that arise as the pt moves through the transplant process.        KYARA Leon, LMSW           "

## 2024-05-29 NOTE — LETTER
May 31, 2024        Rafi Machado  [address]  [place] [address]  Phone: [phone]  Fax: [phone]             [facility]- Transplant 1st Fl  [address]  [place] [address]  Phone: [phone]   Patient: Alycia Lewis   MR Number: [id number]   YOB: 1970   Date of Visit: 5/29/2024       Dear Dr. Rafi Machado    Thank you for referring Alycia Lewis to me for evaluation. Attached you will find relevant portions of my assessment and plan of care.    If you have questions, please do not hesitate to call me. I look forward to following Alycia Lewis along with you.    Sincerely,    Ngozi Buitrago, NP    Enclosure    If you would like to receive this communication electronically, please contact [email or web address] or [phone] to request [employer] access.    [employer] Link is a tool which provides read-only access to select patient information with whom you have a relationship. Its easy to use and provides real time access to review your patients record including encounter summaries, notes, results, and demographic information.    If you feel you have received this communication in error or would no longer like to receive these types of communications, please e-mail [email or web address]

## 2024-05-29 NOTE — PROGRESS NOTES
INITIAL PATIENT EDUCATION NOTE     Ms. Alycia Lewis was seen in pre-kidney transplant clinic for evaluation for kidney, kidney/pancreas or pancreas only transplant.  The patient attended an individual video education session that discussed/reviewed the following aspects of transplantation: evaluation including diagnostic and laboratory testing,(Chemistries, Hematology, Serologies including HIV and Hepatitis and HLA) required for transplantation and selection committee process, UNOS waitlist management/multiple listings, types of organs offered (KDPI < 85%, KDPI > 85%, PHS risk, DCD, HCV+, HIV+ for HIV+ recipients and enbloc/dual), financial aspects, surgical procedures, dietary instruction pre- and post-transplant, health maintenance pre- and post-transplant, post-transplant hospitalization and outpatient follow-up, potential to participate in a research protocol, and medication management and side effects.  A question and answer session was provided after the presentation.    The patient was seen by all members of the multi-disciplinary team to include: Nephrologist/GILMA, Surgeon, , Transplant Coordinator, , Pharmacist and Dietician (if applicable).    The patient reviewed and signed all consents for evaluation which were witnessed and sent to scanning into the EPIC chart.    The patient was given an education book and plan for further evaluation based on her individual assessment.      Reviewed program requirement for complete COVID vaccination with documentation prior to listing.  COVID education information reviewed with patient. Patient encouraged to be up to date on all vaccinations.     The patient was informed that the transplant team would manage immediate post op pain. If the patient requires long term pain management, they will need to have that pain management addressed by their PCP or previous provider who wrote for long term pain medicines.    The patient  was encouraged to call with any questions or concerns.

## 2024-05-29 NOTE — PROGRESS NOTES
PHARM.D. PRE-TRANSPLANT NOTE:    This patient's medication therapy was evaluated as part of her pre-transplant evaluation.      The following general pharmacologic concerns were noted: aspirin-dipyridamole-- hold perioperatively/assess need to resume post-op; on Ozempic- hold prior to transplant if possible.     The following concerns for post-operative pain management were noted: has tolerated oxycodone and tramadol, but not morphine or hydrocodone    The following pharmacologic concerns related to HCV therapy were noted: none      This patient's medication profile was reviewed for considerations for DAA Hepatitis C therapy:    [x]  No current inducers of CYP 3A4 or PGP  [x]  No amiodarone on this patient's EMR profile in the last 24 months  [x]  No past or current atrial fibrillation on this patient's EMR profile       Current Outpatient Medications   Medication Sig Dispense Refill    B complex w-C no.20/folic acid (NEPHROCAPS ORAL) Take 1 capsule by mouth once daily.      calcitRIOL (ROCALTROL) 0.25 MCG Cap Take 0.25 mcg by mouth once daily. Every Monday through Fri      cyclobenzaprine (FLEXERIL) 10 MG tablet Take 10 mg by mouth 3 (three) times daily as needed for Muscle spasms.      dipyridamole-aspirin 200-25 mg (AGGRENOX)  mg CM12 Take 1 capsule by mouth 2 (two) times daily.      insulin glargine (LANTUS U-100 INSULIN) 100 unit/mL injection Inject 20 Units into the skin every evening.      insulin lispro 100 unit/mL injection Inject into the skin. Sliding scale      levETIRAcetam (KEPPRA) 500 MG Tab Take 1 tablet (500 mg total) by mouth 2 (two) times daily. Give postdialysis on dialysis days      metoprolol tartrate (LOPRESSOR) 50 MG tablet Take 50 mg by mouth 2 (two) times daily.      oxybutynin (DITROPAN-XL) 5 MG TR24 Take 5 mg by mouth every evening.      pantoprazole (PROTONIX) 40 MG tablet Take 40 mg by mouth once daily.      rosuvastatin (CRESTOR) 40 MG Tab Take 40 mg by mouth every evening.       semaglutide (OZEMPIC SUBQ) Inject 1 mg into the skin once a week.       No current facility-administered medications for this visit.           I am available for consultation and can be contacted, as needed by the other members of the Transplant team.

## 2024-05-29 NOTE — PATIENT INSTRUCTIONS
referral for Physical therapy  to build upper and lower Muscle strength and increase stamina.  Discussed while working on getting stronger, get an Up to date  Mammogram and GYN exam

## 2024-05-29 NOTE — TELEPHONE ENCOUNTER
----- Message from KYARA Leon, LMSW sent at 5/29/2024  3:32 PM CDT -----  Hi!     Patient has no transportation plan. Caregiver does not drive. Pt currently unacceptable from SW perspective.

## 2024-05-29 NOTE — PROGRESS NOTES
PRE-TRANSPLANT INFECTIOUS DISEASE CONSULT    Reason for Visit:  Pre-transplant evaluation  Referring Provider: Dr. Rafi Machado     History of Present Illness:    53 y.o. female with a history of ESRD on HD presents for pre-kidney transplant evaluation.     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  Recent dental infections, issues or procedures: No  History of chicken pox: unsure  History of shingles: No  History of STI: No  History of COVID infection: No    History of Immunosuppression:  Prior chemotherapy / immunosuppression: No  Prior transplant: No  History of splenectomy: No    Tuberculosis:  Prior screening for latent TB: Yes  Prior diagnosis of latent TB: No  Risk factors for TB: No    Geographical exposures:  Currently lives in [place] with   Lived in the following states: [place]  Lived or travelled to the [facility] [place]: No  International travel: [place]; [employer]   Travel-associated illness: No    Social/Environmental:  Occupation: ,   Pets: Three dogs  Livestock: No  Fishing / hunting: No  Hobbies: indoor activities   Water: [place]   Consumption of raw/undercooked meat or seafood?  No  Tobacco: No  Alcohol: No  Recreational drug use:  No  Sexual partners:       Past Histories:   Past Medical History:   Diagnosis Date    Anemia     Coronary artery disease     Diabetes mellitus     Diabetes mellitus, type 2     Disorder of kidney and ureter     Hyperlipidemia     Hypertension     Narrowing of both carotid arteries     Obesity     Renal disorder     Seizures     Sleep apnea, unspecified     Thyroid disease     Transfusion reaction      Past Surgical  History:   Procedure Laterality Date    APPENDECTOMY      CHOLECYSTECTOMY      EYE SURGERY      HEEL SPUR EXCISION      HYSTERECTOMY      KNEE ARTHROSCOPY Left     SHOULDER SURGERY      SPINE SURGERY      THROMBECTOMY Left 09/29/2023    Procedure: THROMBECTOMY;  Surgeon: Cristiano Estevez DO;  Location: [facility] CATH LAB;  Service: Peripheral Vascular;  Laterality: Left;  forearm graft    THYROID SURGERY       Family History   Problem Relation Name Age of Onset    Kidney disease Mother      Alzheimer's disease Mother      Heart disease Father      Hyperlipidemia Brother      Hypertension Brother      Diabetes Brother       Social History     Tobacco Use    Smoking status: Never    Smokeless tobacco: Never   Substance Use Topics    Alcohol use: Not Currently    Drug use: Never     Review of patient's allergies indicates:   Allergen Reactions    Adhesive tape-silicones Rash    Hydrocodone-acetaminophen Nausea And Vomiting and Rash    Adhesive Rash    Opioids - morphine analogues Other (See Comments)     Altered mental status    Toradol [ketorolac] Other (See Comments)     Pt with h/o ESRD         Current antibiotics:  Antibiotics (From admission, onward)      None              Review of Systems  Review of Systems   Constitutional: Negative for chills, fever, malaise/fatigue and night sweats.   HENT:  Negative for congestion and sore throat.    Eyes:  Positive for visual disturbance (glasses). Negative for blurred vision.   Cardiovascular:  Negative for chest pain and leg swelling.   Respiratory:  Negative for cough, shortness of breath and sputum production.    Skin:  Negative for color change, dry skin and itching.   Musculoskeletal:  Negative for back pain, joint pain and joint swelling.   Gastrointestinal:  Negative for abdominal pain, diarrhea, heartburn, nausea and vomiting.   Genitourinary:  Negative for dysuria, flank pain and hematuria.   Neurological:  Negative for dizziness, numbness and weakness.  "  Psychiatric/Behavioral:  Negative for altered mental status and depression. The patient is not nervous/anxious.           Objective  Physical Exam  Constitutional:       General: She is not in acute distress.     Appearance: Normal appearance. She is well-developed. She is not ill-appearing or diaphoretic.   HENT:      Head: Normocephalic and atraumatic.      Right Ear: External ear normal.      Left Ear: External ear normal.      Nose: Nose normal.   Eyes:      General: No scleral icterus.        Right eye: No discharge.         Left eye: No discharge.      Extraocular Movements: Extraocular movements intact.      Conjunctiva/sclera: Conjunctivae normal.      Comments: glasses   Pulmonary:      Effort: Pulmonary effort is normal. No respiratory distress.      Breath sounds: No stridor.   Musculoskeletal:         General: Normal range of motion.   Skin:     Findings: No erythema or rash.      Comments: Tunneled chest wall catheter c/d/i   Neurological:      General: No focal deficit present.      Mental Status: She is alert and oriented to person, place, and time. Mental status is at baseline.      Cranial Nerves: No cranial nerve deficit.   Psychiatric:         Mood and Affect: Mood normal.         Behavior: Behavior normal.         Thought Content: Thought content normal.         Judgment: Judgment normal.           Labs:    CBC:   Lab Results   Component Value Date    WBC 9.25 05/29/2024    HGB 8.4 (L) 05/29/2024    HCT 27.6 (L) 05/29/2024     (H) 05/29/2024     05/29/2024    GRAN 7.3 05/29/2024    GRAN 79.0 (H) 05/29/2024    LYMPH 1.3 05/29/2024    LYMPH 13.6 (L) 05/29/2024    MONO 0.6 05/29/2024    MONO 6.4 05/29/2024    EOSINOPHIL 0.1 05/29/2024       Syphilis screening: No results found for: "RPR", "PRPQ", "FTAABS"     TB screening: No results found for: "TBGOLDPLUS", "TSPOTSCREN"    HIV screening:   Lab Results   Component Value Date    EXX16APDI Non-reactive 05/29/2024       Strongyloides " "IgG: No results found for: "STRONGANTIGG"    Hepatitis Serologies:   Lab Results   Component Value Date    HEPAIGG Non-reactive 05/29/2024    HEPBCAB Non-reactive 05/29/2024    HEPBSAB >1000.00 05/29/2024    HEPBSAB Reactive 05/29/2024    HEPCAB Non-reactive 05/29/2024        Varicella IgG: No results found for: "VARICELLAINT"      Immunization History:  Received all childhood vaccines: Yes  All household members receive annual flu vaccine: Yes  All household members are up to date on COVID vaccine: Yes    Immunization History   Administered Date(s) Administered    COVID-19, MRNA, LN-S, PF (Pfizer) (Gray Cap) 08/16/2022    COVID-19, MRNA, LN-S, PF (Pfizer) (Purple Cap) 04/07/2021, 04/28/2021, 01/10/2022    Hepatitis B (recombinant) Adjuvanted, 2 dose 06/22/2022, 07/25/2022, 08/24/2022, 10/18/2022    Influenza - Quadrivalent - High Dose - PF (65 years and older) 10/20/2023    Influenza - Quadrivalent - PF *Preferred* (6 months and older) 10/13/2015, 12/12/2017, 01/10/2022    Pneumococcal Conjugate - 20 Valent 07/24/2023    Pneumococcal Polysaccharide - 23 Valent 01/24/2019    Tdap 07/16/2020, 10/27/2021    Shingles - never  Hepatitis A - never      Assessment and Plan    1. Risks of Infection: Available serologies were reviewed. No unusual risks of infection or significant barriers to transplantation were identified from the infectious disease standpoint given the information available at this time.    - Acute infectious issues: None   - Pending serologies: Quantiferon gold / T-spot, Strongyloides IgG, and VZV IgG   - Please call if any pending serologic testing is positive.    2. Immunizations:  Based on the patient's immunization history and serologies, the following immunizations are recommended:  - Hepatitis A    Patient does not have immunity to hepatitis A    Vaccination ordered today: Yes   - Hepatitis B    Patient does have immunity to hepatitis B    Vaccination ordered today: No. Reason for not ordering: " Immunity   - COVID    Current CDC vaccination recommendations were discussed with the patient   - Annual high dose influenza     Vaccination ordered today: No. Reason for not ordering: vaccination up to date   - Prevnar 20    Vaccination ordered today: No. Reason for not ordering: vaccination up to date   - Tdap    Vaccination ordered today: No. Reason for not ordering: vaccination up to date   - Shingrix    Vaccination ordered today: Yes    Recommended Pre-Transplant Immunization Schedule   Vaccine  0m 1m 2m 6m   Pneumococcal conjugate vaccine (Prevnar 20) X      Tetanus-diphtheria-pertussis (Tdap)* X      Hepatitis A Vaccine (Havrix)** X   X   Hepatitis B Vaccine (Heplisav)** X X     Influenza (annual) X      Zoster Recombinant Vaccine (Shingrix) X  X           *Administer booster every 10 years.       **Administer if no immunity demonstrated on serologies               Patient will receive vaccines at local pharmacy. A written prescription was provided for all vaccine doses.     3. Counseling:   I discussed with the patient the risk for increased susceptibility to infections following transplantation including increased risk for infection right after transplant and if rejection should occur.  The patient has been counseled on the importance of vaccinations to decrease risk of infection and severe illness. Specific guidance has been provided to the patient regarding the patient's occupation, hobbies and activities to avoid future infectious complications.     4. Transplant Candidacy: Based on available information, there are no identified significant barriers to transplantation from an infectious disease standpoint.  Final determination of transplant candidacy will be made once evaluation is complete and reviewed by the Selection Committee.      Follow up with infectious disease as needed.       The total time for evaluation and management services performed on 05/29/2024 was greater than 45 minutes.

## 2024-05-30 LAB
GAMMA INTERFERON BACKGROUND BLD IA-ACNC: 0 IU/ML
M TB IFN-G CD4+ BCKGRND COR BLD-ACNC: 0 IU/ML
M TB IFN-G CD4+ BCKGRND COR BLD-ACNC: 0.01 IU/ML
MITOGEN IGNF BCKGRD COR BLD-ACNC: 0.65 IU/ML
TB GOLD PLUS: NEGATIVE

## 2024-05-31 ENCOUNTER — DOCUMENTATION ONLY (OUTPATIENT)
Dept: TRANSPLANT | Facility: CLINIC | Age: 54
End: 2024-05-31
Payer: MEDICARE

## 2024-05-31 ENCOUNTER — COMMITTEE REVIEW (OUTPATIENT)
Dept: TRANSPLANT | Facility: CLINIC | Age: 54
End: 2024-05-31
Payer: MEDICARE

## 2024-05-31 ENCOUNTER — TELEPHONE (OUTPATIENT)
Dept: TRANSPLANT | Facility: CLINIC | Age: 54
End: 2024-05-31
Payer: MEDICARE

## 2024-05-31 LAB
CMV IGG SERPL QL IA: REACTIVE
STRONGYLOIDES ANTIBODY IGG: NEGATIVE
VARICELLA INTERPRETATION: NEGATIVE
VARICELLA ZOSTER IGG: 123.3

## 2024-05-31 NOTE — COMMITTEE REVIEW
Native Organ Dx: Diabetes Mellitus - Type II      Not approved for LRD/CAD transplant due to Frailty with impaired mobility and Inadequate transportation.   Can be re referred when able to ambulate without assistive device ( Physical Therapy recommended with Clearance), have a solid transportation plan for all Pre and Post Transplant appointments and Neurology clearance due to history of seizures.      Note written by Yue Hanks RN    ===============================================    I was present at the meeting and attest to the decision of the committee.    Catrachito Claire  05/31/2024

## 2024-06-20 ENCOUNTER — DOCUMENTATION ONLY (OUTPATIENT)
Dept: TRANSPLANT | Facility: CLINIC | Age: 54
End: 2024-06-20
Payer: MEDICARE

## 2024-08-14 ENCOUNTER — HOSPITAL ENCOUNTER (OUTPATIENT)
Dept: TELEMEDICINE | Facility: HOSPITAL | Age: 54
Discharge: HOME | End: 2024-08-14
Payer: MEDICARE

## 2024-08-14 DIAGNOSIS — I63.9 ISCHEMIC STROKE: Primary | ICD-10-CM

## 2024-08-14 PROCEDURE — G0508 CRIT CARE TELEHEA CONSULT 60: HCPCS | Mod: 95,,, | Performed by: PSYCHIATRY & NEUROLOGY

## 2024-08-14 NOTE — SUBJECTIVE & OBJECTIVE
HPI:  53 y.o. female with R sided weakness, LKW 5:45p.  She developed LH and fell on her right side, particularly on the R shoulder.  She has a pre-existing shoulder injury and a R tibial fracture 2 weeks ago.  She feels R sided is weak since the fall.  Strength improving in ED.  She has a history of seizures but isn't sure she had a seizure today.      Hx carotid stenosis    Images personally reviewed and interpreted:  Study: Done but not available for review  Study Interpretation: not available     7:22p - CT reviewed and it shows age indeterminate L pontine infarct.  CTA with B extracranial carotid stenosis (prob high-grade)    Exam - at least antigravity power on R with encouragement; protects face with RUE.  Opens/closes R hand.  LEs symmetric - both fall to bed.      Assessment and plan:  Suspected mild, improving ischemic stroke with embellishment.  DDx includes exacerbation of old deficit (if pontine lesion is old), seizure with post-ictal deficit and conversion d/o.   Given improving course, embellishment, diagnostic uncertainty, recent tibial fracture, anemia I don't recommend thrombolysis.  In addition, if the L pontine lucency is acute, it suggests age > 4.5 hrs.    Rec MRI brain.  If acute infarct in L carotid territory, she needs vascular surgery evaluation.  Otherwise, cont DAPT, statin and add DVT prophylaxis, PT/OT/ST, echo, HgA1c, FLP (target LDL <70mg/dL), IVFs with NS @ 75cc/hr.    Lytics recommendation: Thrombolytic therapy not recommended due to Mild Non-Disabling Symptoms, Hypodensity on CT , Recent serious trauma , and see above  Thrombectomy recommendation: No; No large vessel occlusion identified on imaging   Placement recommendation: pending further studies

## 2024-08-15 NOTE — TELEMEDICINE CONSULT
[facility]  Vascular Neurology  Comprehensive Stroke Center  TeleVascular Neurology Acute Consultation Note        Consult Information  Consults    Consulting Provider: JOSE F ELKINS   Current Providers  No providers found    Patient Location: [facility] TELEMEDICINE ED [facility] PATIENT [facility]OW [facility] Emergency Department    Spoke hospital nurse at bedside with patient assisting consultant.  Patient information was obtained from patient.       Stroke Documentation  Acute Stroke Times   Last Known Normal Date: 08/14/24  Last Known Normal Time: 1745  Stroke Team Arrival Date: 08/14/24  Stroke Team Arrival Time: 1857  CT Interpretation Time: 1923  Thrombolytic Therapy Recommended: No    NIH Scale:  Interval: baseline  1a. Level of Consciousness: 0-->Alert, keenly responsive  1b. LOC Questions: 0-->Answers both questions correctly  1c. LOC Commands: 0-->Performs both tasks correctly  2. Best Gaze: 0-->Normal  3. Visual: 0-->No visual loss  4. Facial Palsy: 1-->Minor paralysis (flattened nasolabial fold, asymmetry on smiling)  5a. Motor Arm, Left: 0-->No drift, limb holds 90 (or 45) degrees for full 10 secs  5b. Motor Arm, Right: 2-->Some effort against gravity, limb cannot get to or maintain (if cued) 90 (or 45) degrees, drifts down to bed, but has some effort against gravity  6a. Motor Leg, Left: 2-->Some effort against gravity, leg falls to bed by 5 secs, but has some effort against gravity  6b. Motor Leg, Right: 2-->Some effort against gravity, leg falls to bed by 5 secs, but has some effort against gravity  7. Limb Ataxia: 0-->Absent  8. Sensory: 1-->Mild-to-moderate sensory loss, patient feels pinprick is less sharp or is dull on the affected side, or there is a loss of superficial pain with pinprick, but patient is aware of being touched  9. Best Language: 0-->No aphasia, normal  10. Dysarthria: 0-->Normal  11. Extinction and Inattention (formerly Neglect): 0-->No abnormality  Total  (NIH Stroke Scale): 8      Modified [facility]:    Alo Coma Scale:     ABCD2 Score:    [id number] Score:    HAS -BLED Score:    ICH Score:    Hunt & Shelby Classification:      There were no vitals taken for this visit.      In my opinion, this was a: Tier 1; VAN Stroke Assessment: Negative     Medical Decision Making  HPI:  53 y.o. female with R sided weakness, LKW 5:45p.  She developed LH and fell on her right side, particularly on the R shoulder.  She has a pre-existing shoulder injury and a R tibial fracture 2 weeks ago.  She feels R sided is weak since the fall.  Strength improving in ED.  She has a history of seizures but isn't sure she had a seizure today.      Hx carotid stenosis    Images personally reviewed and interpreted:  Study: Done but not available for review  Study Interpretation: not available     7:22p - CT reviewed and it shows age indeterminate L pontine infarct.  CTA with B extracranial carotid stenosis (prob high-grade)    Exam - at least antigravity power on R with encouragement; protects face with RUE.  Opens/closes R hand.  LEs symmetric - both fall to bed.      Assessment and plan:  Suspected mild, improving ischemic stroke with embellishment.  DDx includes exacerbation of old deficit (if pontine lesion is old), seizure with post-ictal deficit and conversion d/o.   Given improving course, embellishment, diagnostic uncertainty, recent tibial fracture, anemia I don't recommend thrombolysis.  In addition, if the L pontine lucency is acute, it suggests age > 4.5 hrs.    Rec MRI brain.  If acute infarct in L carotid territory, she needs vascular surgery evaluation.  Otherwise, cont DAPT, statin and add DVT prophylaxis, PT/OT/ST, echo, HgA1c, FLP (target LDL <70mg/dL), IVFs with NS @ 75cc/hr.    Lytics recommendation: Thrombolytic therapy not recommended due to Mild Non-Disabling Symptoms, Hypodensity on CT , Recent serious trauma , and see above  Thrombectomy recommendation: No; No large vessel  occlusion identified on imaging   Placement recommendation: pending further studies              ROS  Physical Exam  Past Medical History:   Diagnosis Date    Anemia     Coronary artery disease     Diabetes mellitus     Diabetes mellitus, type 2     Disorder of kidney and ureter     Hyperlipidemia     Hypertension     Narrowing of both carotid arteries     Obesity     Renal disorder     Seizures     Sleep apnea, unspecified     Thyroid disease     Transfusion reaction      Past Surgical History:   Procedure Laterality Date    APPENDECTOMY      CHOLECYSTECTOMY      EYE SURGERY      HEEL SPUR EXCISION      HYSTERECTOMY      KNEE ARTHROSCOPY Left     SHOULDER SURGERY      SPINE SURGERY      THROMBECTOMY Left 09/29/2023    Procedure: THROMBECTOMY;  Surgeon: Cristiano Estevez DO;  Location: [facility] CATH LAB;  Service: Peripheral Vascular;  Laterality: Left;  forearm graft    THYROID SURGERY       Family History   Problem Relation Name Age of Onset    Kidney disease Mother      Alzheimer's disease Mother      Heart disease Father      Hyperlipidemia Brother      Hypertension Brother      Diabetes Brother         Diagnoses  Problem Noted   Ischemic Stroke 8/14/2024       Inocencio Lockhart MD      Emergent/Acute neurological consultation requested by spoke provider due to critical concerns for possible cerebrovascular event that could result in permanent loss of neurologic/bodily function, severe disability or death of this patient.  Immediate/timely evaluation by a highly prepared expert is paramount for optimal outcomes  High risk for neurological deterioration if not properly diagnosed  High risk for neurological deterioration if not treated promplty/as soon as possible  Complex diagnostic evaluation may be required (advanced imaging)  High risk treatment options (thrombolytics and/or thrombectomy)    Patient care was coordinated with spoke provider, including but not limted to    Discussing likely diagnosis/etiology of  symptoms  Making recommendations for further diagnostic studies  Making recommendations for intravenous thrombolytics or other advanced therapies  Making recommendations for disposition (admission/transfer for higher level of care)

## 2024-08-17 ENCOUNTER — TELEMEDICINE (OUTPATIENT)
Dept: NEUROLOGY | Facility: HOSPITAL | Age: 54
End: 2024-08-17
Payer: MEDICARE

## 2024-08-17 DIAGNOSIS — I63.9 LEFT PONTINE STROKE: Primary | ICD-10-CM

## 2024-08-17 PROCEDURE — G0426 INPT/ED TELECONSULT50: HCPCS | Mod: 95,,, | Performed by: PSYCHIATRY & NEUROLOGY

## 2024-08-17 NOTE — CONSULTS
[facility] [place] [facility]  Neurology  Consult Note    Patient Name: Alycia Lewis  MRN: [id number]  Admission Date: (Not on file)  Hospital Length of Stay: 0 days  Code Status: [unfilled]   Attending Provider:  Cristopher Whitfield MD  Consulting Provider: Alvaro Mccain MD  Primary Care Physician: Rivka Vazquez NP  Principal Problem:[unfilled]    Consults  Subjective:     Chief Complaint:  Stroke    HPI: 54 y/o female comes to ED due to sudden onset of right-sided weakness and slurred spech. Stroke alert was called. Pt was outside of therapeutic window for thrombolytics. No LVO on CTA head and neck. MRI brain showed a left pontine stroke. Pt repots that symptoms have been improving. No Hx of atrial fibrillation. Pt does tell me that in July/2024 she had coronary and carotid stent placed. Denies headaches, visual disturbances, vertigo.    Per notes pt has HX of seizures and had tow focal seizures in hospital with left face and arm twitching.    Past Medical History:   Diagnosis Date    Anemia     Coronary artery disease     Diabetes mellitus     Diabetes mellitus, type 2     Disorder of kidney and ureter     Hyperlipidemia     Hypertension     Narrowing of both carotid arteries     Obesity     Renal disorder     Seizures     Sleep apnea, unspecified     Thyroid disease     Transfusion reaction        Past Surgical History:   Procedure Laterality Date    APPENDECTOMY      CHOLECYSTECTOMY      EYE SURGERY      HEEL SPUR EXCISION      HYSTERECTOMY      KNEE ARTHROSCOPY Left     SHOULDER SURGERY      SPINE SURGERY      THROMBECTOMY Left 09/29/2023    Procedure: THROMBECTOMY;  Surgeon: Cristiano Estevez DO;  Location: [facility] CATH LAB;  Service: Peripheral Vascular;  Laterality: Left;  forearm graft    THYROID SURGERY         Review of patient's allergies indicates:   Allergen Reactions    Adhesive tape-silicones Rash    Hydrocodone-acetaminophen Nausea And Vomiting and Rash    Adhesive Rash    Opioids - morphine  analogues Other (See Comments)     Altered mental status    Toradol [ketorolac] Other (See Comments)     Pt with h/o ESRD         Current Neurological Medications:     Current Outpatient Medications on File Prior to Visit   Medication Sig    B complex w-C no.20/folic acid (NEPHROCAPS ORAL) Take 1 capsule by mouth once daily.    calcitRIOL (ROCALTROL) 0.25 MCG Cap Take 0.25 mcg by mouth once daily. Every Monday through Fri    cyclobenzaprine (FLEXERIL) 10 MG tablet Take 10 mg by mouth 3 (three) times daily as needed for Muscle spasms.    dipyridamole-aspirin 200-25 mg (AGGRENOX)  mg CM12 Take 1 capsule by mouth 2 (two) times daily.    insulin glargine (LANTUS U-100 INSULIN) 100 unit/mL injection Inject 20 Units into the skin every evening.    insulin lispro 100 unit/mL injection Inject into the skin. Sliding scale    levETIRAcetam (KEPPRA) 500 MG Tab Take 1 tablet (500 mg total) by mouth 2 (two) times daily. Give postdialysis on dialysis days    metoprolol tartrate (LOPRESSOR) 50 MG tablet Take 50 mg by mouth 2 (two) times daily.    oxybutynin (DITROPAN-XL) 5 MG TR24 Take 5 mg by mouth every evening.    pantoprazole (PROTONIX) 40 MG tablet Take 40 mg by mouth once daily.    rosuvastatin (CRESTOR) 40 MG Tab Take 40 mg by mouth every evening.    semaglutide (OZEMPIC SUBQ) Inject 1 mg into the skin once a week.     No current facility-administered medications on file prior to visit.      Family History       Problem Relation (Age of Onset)    Alzheimer's disease Mother    Diabetes Brother    Heart disease Father    Hyperlipidemia Brother    Hypertension Brother    Kidney disease Mother          Tobacco Use    Smoking status: Never    Smokeless tobacco: Never   Substance and Sexual Activity    Alcohol use: Not Currently    Drug use: Never    Sexual activity: Not Currently     Review of Systems   Constitutional:  Negative for diaphoresis.   HENT:  Negative for trouble swallowing.    Eyes:  Negative for photophobia.    Cardiovascular:  Negative for chest pain.   Gastrointestinal:  Negative for abdominal pain.   Genitourinary:  Negative for flank pain.   Musculoskeletal:  Negative for back pain.   Neurological:  Negative for headaches.   Psychiatric/Behavioral:  Negative for confusion.      Objective:     Vital Signs (Most Recent):    Vital Signs (24h Range):  [unfilled]        There is no height or weight on file to calculate BMI.    Physical Exam  Constitutional:       General: She is not in acute distress.  Pulmonary:      Effort: No respiratory distress.   Neurological:      Mental Status: She is alert.      Cranial Nerves: Dysarthria present.      Motor: No tremor.      Comments: RUE pronator drift  RLE drift before 5 seconds              Significant Labs: CBC: [unfilled]  CMP: [unfilled]    Significant Imaging:  MRI brain    Assessment and Plan:     54 y/o female with left pontine stroke. Pt has right-sided motor manifestations. CTA head and neck showed no LVO but what seem to be significant right carotid stenosis. Her stroke is a posterior circulation one which makes carotid stenosis asymptomatic at this point. Vascular surgery has evaluated pt; not candidate for intervention for now. Given her medical Hx this could small-vessels disease but cannot R/O arrhythmias.   -SBP up to 160 mmHg.   -ASA 81 mg daily and clopidogrel 75 mg daily.   -High-dose statin.   -PT/OT/ST.   -Telemetry monitoring.   -Echo with bubble study if not done recently   -If pt has Hx of seizure she should be on antiepileptic medication. Levetiracetam 500 mg BID is a choice.      VTE Risk Mitigation (From admission, onward)      None            Thank you for your consult. I will follow-up with patient. Please contact us if you have any additional questions.    Alvaro Mccain MD  Neurology  [facility] [place] [facility]UR[facility]

## 2024-08-20 ENCOUNTER — HOSPITAL ENCOUNTER (OUTPATIENT)
Dept: TELEMEDICINE | Facility: HOSPITAL | Age: 54
Discharge: HOME | End: 2024-08-20
Payer: MEDICARE

## 2024-08-20 PROCEDURE — G0426 INPT/ED TELECONSULT50: HCPCS | Mod: 95,,, | Performed by: PSYCHIATRY & NEUROLOGY

## 2024-08-20 NOTE — SUBJECTIVE & OBJECTIVE
HPI:  53 y.o. female w/ Hx of L franck stroke 7 days ago, woke up this morning worse than previous, blurry vision in R eye, right sided weakness.  Bilateral carotid mod/sev stenosis bilaterally.  July 10 she had a stent in one of the carotids    Imaging personally reviewed & interpreted:  CT Brain: no evidence of early or subacute ischemic changes, intracerebral hemorrhage or hyperdense vessel signs (per radiology)  CTA Head & Neck: Not performed at time of consultation       Assessment and plan:  History of Pontine stroke - worsening R hemiparesis, concerning for extension of pontine infarct.  Continue asa/plavix  MRI brain w/o contrast, MRA w/o contrast    Lytics recommendation: Thrombolytic therapy not recommended due to Outside of treatment window  and Recent previous stroke   Thrombectomy recommendation: No; at this time symptoms not suggestive of large vessel occlusion  Placement recommendation: pending further studies

## 2024-08-20 NOTE — TELEMEDICINE CONSULT
[facility]  Vascular Neurology  Comprehensive Stroke Center  TeleVascular Neurology Acute Consultation Note        Consult Information  Consults    Consulting Provider: MAGNUS ZEPEDA   Current Providers  No providers found    Patient Location: [facility] TELEMEDICINE ED [facility] PATIENT FLOW C[facility] Emergency Department    Spoke hospital nurse at bedside with patient assisting consultant.  Patient information was obtained from patient.       Stroke Documentation  Acute Stroke Times   Last Known Normal Date: 08/19/24  Unknown Normal Time: Unknown Time  Stroke Team Called Time: 0801  Stroke Team Arrival Time: 0810    NIH Scale:  1a. Level of Consciousness: 0-->Alert, keenly responsive  1b. LOC Questions: 0-->Answers both questions correctly  1c. LOC Commands: 0-->Performs both tasks correctly  2. Best Gaze: 0-->Normal  3. Visual: 0-->No visual loss  4. Facial Palsy: 1-->Minor paralysis (flattened nasolabial fold, asymmetry on smiling)  5a. Motor Arm, Left: 0-->No drift, limb holds 90 (or 45) degrees for full 10 secs  5b. Motor Arm, Right: 1-->Drift, limb holds 90 (or 45) degrees, but drifts down before full 10 secs, does not hit bed or other support  6a. Motor Leg, Left: 3-->No effort against gravity, leg falls to bed immediately (chronic, pain/fracture)  6b. Motor Leg, Right: 2-->Some effort against gravity, leg falls to bed by 5 secs, but has some effort against gravity  7. Limb Ataxia: 0-->Absent  8. Sensory: 0-->Normal, no sensory loss  9. Best Language: 0-->No aphasia, normal  10. Dysarthria: 0-->Normal  11. Extinction and Inattention (formerly Neglect): 0-->No abnormality  Total (NIH Stroke Scale): 7      Modified [place]:    [place] Coma Scale:     ABCD2 Score:    [id number] Score:    HAS -BLED Score:    ICH Score:    Hunt & [place] Classification:      There were no vitals taken for this visit.      In my opinion, this was a: Tier 2; VAN Stroke Assessment: Negative      Medical Decision Making  HPI:  53 y.o. female w/ Hx of L franck stroke 7 days ago, woke up this morning worse than previous, blurry vision in R eye, right sided weakness.  Bilateral carotid mod/sev stenosis bilaterally.  July 10 she had a stent in one of the carotids    Imaging personally reviewed & interpreted:  CT Brain: no evidence of early or subacute ischemic changes, intracerebral hemorrhage or hyperdense vessel signs (per radiology)  CTA Head & Neck: Not performed at time of consultation       Assessment and plan:  History of Pontine stroke - worsening R hemiparesis, concerning for extension of pontine infarct.  Continue asa/plavix  MRI brain w/o contrast, MRA w/o contrast    Lytics recommendation: Thrombolytic therapy not recommended due to Outside of treatment window  and Recent previous stroke   Thrombectomy recommendation: No; at this time symptoms not suggestive of large vessel occlusion  Placement recommendation: pending further studies               ROS  Physical Exam  Past Medical History:   Diagnosis Date    Anemia     Coronary artery disease     Diabetes mellitus     Diabetes mellitus, type 2     Disorder of kidney and ureter     Hyperlipidemia     Hypertension     Narrowing of both carotid arteries     Obesity     Renal disorder     Seizures     Sleep apnea, unspecified     Thyroid disease     Transfusion reaction      Past Surgical History:   Procedure Laterality Date    APPENDECTOMY      CHOLECYSTECTOMY      EYE SURGERY      HEEL SPUR EXCISION      HYSTERECTOMY      KNEE ARTHROSCOPY Left     SHOULDER SURGERY      SPINE SURGERY      THROMBECTOMY Left 09/29/2023    Procedure: THROMBECTOMY;  Surgeon: Cristiano Estevez DO;  Location: [facility] CATH LAB;  Service: Peripheral Vascular;  Laterality: Left;  forearm graft    THYROID SURGERY       Family History   Problem Relation Name Age of Onset    Kidney disease Mother      Alzheimer's disease Mother      Heart disease Father      Hyperlipidemia Brother       Hypertension Brother      Diabetes Brother         Diagnoses  Problem Noted   Acute Ischemic Stroke 8/14/2024       Tc Laguna MD      Emergent/Acute neurological consultation requested by spoke provider due to critical concerns for possible cerebrovascular event that could result in permanent loss of neurologic/bodily function, severe disability or death of this patient.  Immediate/timely evaluation by a highly prepared expert is paramount for optimal outcomes  High risk for neurological deterioration if not properly diagnosed  High risk for neurological deterioration if not treated promplty/as soon as possible  Complex diagnostic evaluation may be required (advanced imaging)  High risk treatment options (thrombolytics and/or thrombectomy)    Patient care was coordinated with spoke provider, including but not limted to    Discussing likely diagnosis/etiology of symptoms  Making recommendations for further diagnostic studies  Making recommendations for intravenous thrombolytics or other advanced therapies  Making recommendations for disposition (admission/transfer for higher level of care)

## 2024-09-20 DIAGNOSIS — T82.590A MECHANICAL COMPLICATION OF ARTERIOVENOUS FISTULA SURGICALLY CREATED: Primary | ICD-10-CM

## 2024-09-23 DIAGNOSIS — I65.23 BILATERAL CAROTID ARTERY OCCLUSION: Primary | ICD-10-CM

## 2024-09-23 DIAGNOSIS — I73.9 PAD (PERIPHERAL ARTERY DISEASE): ICD-10-CM

## 2024-10-02 NOTE — PROGRESS NOTES
"    [facility] Vascular - Clinic Note  Heri Hester MD      Patient Name: Alycia Lewis                   : 1970      MRN: [id number]   Visit Date: 10/8/2024       History Present Illness     Reason for Visit: Carotid Artery Disease and Left Arm Steal Syndrome    Ms. Lewis presents to the clinic for vascular evaluation secondary to steal syndrome associated with her left arm graft as well as carotid artery stenosis with recent stroke. She is being referred by her general surgeon, Dr. Parry.     She has history of left forearm graft ligation secondary to steal. This access was placed by Ashlie in . She has history of chronic peripheral neuropathy but developed further symptoms and contracture of her hand. She got some symptom improvement but unclear if symptoms returned to normal. She remains with some mild contracture to the fingers of her left hand - also with some left 5th finger abduction at rest. Left upper arm graft placed 24. She states her symptoms have not progressed since her new graft was placed and remain stable since previous access excision/ligation. On occasion she will experience a severe shooting pain to her left hand that resolves.     She presented to the ED presentation 8/15/24 with right arm and leg weakness. CTA was done suggesting right internal carotid artery is 80-90% and left internal carotid artery of 50-60%. She does have a history of seizures. MRI brain noted left sided sided infarct on MRI brain (no right sided infarct).     She reports cardiac stenting in  and is currently on Plavix.       REVIEW OF SYSTEMS:  Review of systems conducted, negative except as stated in the history of present illness. See HPI for details.        Physical Exam      Vitals:    10/08/24 0744   BP: (!) 144/76   BP Location: Right arm   Patient Position: Sitting   Pulse: 97   Weight: 102 kg (224 lb 13.9 oz)   Height: 5' 6" (1.676 m)          General: well-nourished, no " acute distress, and healthy appearing, in a wheelchair, alert, pleasant, conversant, and oriented  Neurologic: cranial nerves are grossly intact, no neurologic deficits, no motor deficits, and no sensory deficits  HEENT: grossly normal and no scleral icterus  Neck/Chest: normal  and soft without lymphadenopathy  Respiratory: breathing easily and without respiratory distress  Abdomen: normal and soft  Cardiology: regular rhythm; slightly fast rate    Upper Extremity Arterial Exam:   Right - radial is non-palpable and palmar arch is present  Left - radial is non-palpable and palmar arch is present  No digit signals to right or left hand    Dialysis Access:  right chest wall tunneled catheter  left upper arm graft  Assessment: good thrill     Musculoskeletal:   Upper Extremity: decreased sensation to left hand, left hand is warm; fixed flexion in fingers on left with abduction of 5th finger on left hand  Lower Extremity: no edema present to bilateral lower extremities              Assessment and Plan     Ms. Lewis is a 53 y.o. with mild steal syndrome associated with her left upper arm hemodialysis graft and carotid artery disease with recent left hemispheric stroke.     I personally reviewed the images from her CTA neck in August which demonstrates severe right internal carotid artery (approximately 80%) and moderate left internal carotid artery (approximately 60%). She would benefit from right carotid endarterectomy. Due to recent cardiac stenting and complex history, will request cardiac clearance to proceed and hold her Plavix.     In regard to her left hand symptoms, she appears to have some chronic left arm neuropathy.  Doesn't appear that she has had any recent change in symptoms with new access creation.  She will likely persist with this chronic neuropathy - will get upper extremity arterial studies with left hand finger plethysmography for further investigation.  Initially appears that left arm  intervention would be unlikely to have significant benefit but will see what testing shows.    Will have her back to review plan for carotid artery disease.        1. Steal syndrome as complication of dialysis access, initial encounter  - Ambulatory referral/consult to Vascular Surgery  - CV Ultrasound doppler arterial arm left; Future  - CV Wrist Brachial Indicies; Future    2. Numbness and tingling in left hand  - CV Ultrasound doppler arterial arm left; Future  - CV Wrist Brachial Indicies; Future    3. ESRD (end stage renal disease)    4. Stenosis of right carotid artery    5. Cerebrovascular accident (CVA) due to embolism of left middle cerebral artery          Imaging Obtained/Reviewed           Medical History     Past Medical History:   Diagnosis Date    Anemia     Carotid artery stenosis     Coronary artery disease     Diabetes mellitus, type 2     Disorder of kidney and ureter     ESRD (end stage renal disease)     Hyperlipidemia     Hypertension     PVD (peripheral vascular disease)     Renal disorder     Seizures     Sleep apnea, unspecified     Thyroid disease     Transfusion reaction      Past Surgical History:   Procedure Laterality Date    APPENDECTOMY      CHOLECYSTECTOMY      EYE SURGERY      HEEL SPUR EXCISION      HYSTERECTOMY      KNEE ARTHROSCOPY Left     PLACEMENT OF ARTERIOVENOUS GRAFT      [place]    PLACEMENT OF ARTERIOVENOUS GRAFT Left 07/23/2024    SHOULDER SURGERY      SPINE SURGERY      THROMBECTOMY Left 09/29/2023    Procedure: THROMBECTOMY;  Surgeon: Cristiano Estevez DO;  Location: [facility] CATH LAB;  Service: Peripheral Vascular;  Laterality: Left;  forearm graft    THYROID SURGERY       Family History   Problem Relation Name Age of Onset    Kidney disease Mother      Alzheimer's disease Mother      Heart disease Father      Hyperlipidemia Brother      Hypertension Brother      Diabetes Brother       Social History     Socioeconomic History    Marital status:      Spouse name: Sadiq  "Joshua   Tobacco Use    Smoking status: Never    Smokeless tobacco: Never   Substance and Sexual Activity    Alcohol use: Not Currently    Drug use: Never    Sexual activity: Not Currently   Social History Narrative    Caregiver      Current Outpatient Medications   Medication Instructions    aspirin 81 MG Chew 1 tablet    B complex w-C no.20/folic acid (NEPHROCAPS ORAL) 1 capsule, Daily    BD INSULIN SYRINGE ULTRA-FINE 0.5 mL 31 gauge x 5/16" Syrg 1 INSULIN SYRINGE AS NEEDED SUBCUTANEOUS THREE TIMES A DAY 30 DAYS    BD ULTRA-FINE MINI PEN NEEDLE 31 gauge x 3/16" Ndle USE AS DIRECTED 4 TIMES A DAY    cyclobenzaprine (FLEXERIL) 10 mg, 3 times daily PRN    diazePAM (VALIUM) 5 MG tablet TAKE 1 TABLET BY MOUTH 30 MINUTES PRIOR TO MRI    diclofenac sodium (VOLTAREN) 1 % Gel APPLY 4 GRAMS 4 TIMES A DAY    insulin glargine U-100 (Lantus) (LANTUS U-100 INSULIN) 20 Units, Nightly    insulin lispro 100 unit/mL injection Inject into the skin. Sliding scale    levETIRAcetam (KEPPRA) 500 mg, Oral, 2 times daily, Give postdialysis on dialysis days    linaCLOtide (LINZESS) 145 mcg Cap capsule 1 capsule at least 30 minutes before the first meal of the day on an empty stomach Orally Once a day for 90 day(s)    metoprolol tartrate (LOPRESSOR) 50 mg, 2 times daily    ondansetron (ZOFRAN-ODT) 4 MG TbDL 1 tablet on the tongue and allow to dissolve Orally every 8 hors PRN for pain for 30 day(s)    oxybutynin (DITROPAN-XL) 5 mg, Nightly    oxyCODONE (ROXICODONE) 10 mg, 3 times daily    oxyCODONE-acetaminophen (PERCOCET) 5-325 mg per tablet 1 tablet, Every 6 hours PRN    pantoprazole (PROTONIX) 40 mg, Daily    PLAVIX 75 mg tablet 1 tablet    rosuvastatin (CRESTOR) 40 mg, Nightly    semaglutide (OZEMPIC SUBQ) 1 mg, Weekly    traMADoL (ULTRAM) 50 mg, Every 8 hours PRN    TRUE METRIX GLUCOSE TEST STRIP Strp AS DIRECTED IN VITRO THREE TIMES A DAY 90 DAYS    TRUEPLUS LANCETS 33 gauge Misc 3 times daily     Review of patient's " allergies indicates:   Allergen Reactions    Adhesive tape-silicones Rash    Hydrocodone-acetaminophen Nausea And Vomiting and Rash    Adhesive Rash    Nsaids (non-steroidal anti-inflammatory drug)      Other reaction(s): kidney issues    Opioids - morphine analogues Other (See Comments)     Altered mental status    Toradol [ketorolac] Other (See Comments)     Pt with h/o ESRD      Dulaglutide Nausea And Vomiting       Patient Care Team:  Rivka Vazquez NP as PCP - General (Nurse Practitioner)  Rafi Machado MD as Consulting Physician (Nephrology)  Gracie Redd (Dialysis Center)  Braulio Dumont MD as Consulting Physician (General Surgery)        No follow-ups on file. In addition to their scheduled follow up, the patient has also been instructed to follow up on as needed basis.     No future appointments.

## 2024-10-08 ENCOUNTER — OFFICE VISIT (OUTPATIENT)
Dept: VASCULAR SURGERY | Facility: CLINIC | Age: 54
End: 2024-10-08
Payer: MEDICARE

## 2024-10-08 VITALS
HEART RATE: 97 BPM | BODY MASS INDEX: 36.14 KG/M2 | HEIGHT: 66 IN | SYSTOLIC BLOOD PRESSURE: 144 MMHG | DIASTOLIC BLOOD PRESSURE: 76 MMHG | WEIGHT: 224.88 LBS

## 2024-10-08 DIAGNOSIS — T82.898A STEAL SYNDROME AS COMPLICATION OF DIALYSIS ACCESS, INITIAL ENCOUNTER: ICD-10-CM

## 2024-10-08 DIAGNOSIS — I65.21 STENOSIS OF RIGHT CAROTID ARTERY: ICD-10-CM

## 2024-10-08 DIAGNOSIS — R20.0 NUMBNESS AND TINGLING IN LEFT HAND: ICD-10-CM

## 2024-10-08 DIAGNOSIS — N18.6 ESRD (END STAGE RENAL DISEASE): Primary | ICD-10-CM

## 2024-10-08 DIAGNOSIS — I63.412 CEREBROVASCULAR ACCIDENT (CVA) DUE TO EMBOLISM OF LEFT MIDDLE CEREBRAL ARTERY: ICD-10-CM

## 2024-10-08 DIAGNOSIS — R20.2 NUMBNESS AND TINGLING IN LEFT HAND: ICD-10-CM

## 2024-10-28 NOTE — PROGRESS NOTES
"    [facility] - Clinic Note  Heri Hester MD      Patient Name: Alycia Lewis                   : 1970      MRN: [id number]   Visit Date: 2024       History Present Illness     Reason for Visit: Follow up -  Carotid Artery Disease and Left Hand symptoms    Ms. Lewis presents to the clinic for 4 week follow up. Left upper extremity arterial duplex obtained 10/8/24. She has a left arm graft that was placed in July and has history of steal syndrome to this arm following previous forearm access. She has been cannulating her graft for 1 month in the dialysis unit. She is hopeful to get back to home hemodialysis. She feels her symptoms like there is some mild worsening in the left hand.  Some left 5th finger adduction problems. She wants to avoid abandoning her access. She was additionally evaluated by her cardiologist for cardiac risk assessment. She reports Saturday she felt "strange" and went into the ED for evaluation. She states everything was stable. She states she has been dealing with dizziness from vertigo.           REVIEW OF SYSTEMS:  Review of systems conducted, negative except as stated in the history of present illness. See HPI for details.        Physical Exam      Vitals:    24 0751   BP: (!) 93/54   BP Location: Right arm   Patient Position: Sitting   Pulse: 83   Weight: 93.9 kg (207 lb)   Height: 5' 6" (1.676 m)          General: well-nourished, no acute distress, and healthy appearing, ambulating normally, alert, pleasant, conversant, and oriented  Neurologic: cranial nerves are grossly intact, no neurologic deficits, no motor deficits, and no sensory deficits  HEENT: grossly normal and no scleral icterus  Neck/Chest: normal  and soft without lymphadenopathy  Respiratory: breathing easily and without respiratory distress  Abdomen: normal and soft  Cardiology: regular rate and rhythm    Upper Extremity Arterial Exam:   Left - radial is " non-palpable    Dialysis Access:  right chest wall tunneled catheter  left upper arm graft  Assessment:     Musculoskeletal:   Upper Extremity: normal left hand function except for difficulty with adduction of 5th finger  Lower Extremity: no edema present to bilateral lower extremities                  Assessment and Plan     Ms. Lewis is a 54 y.o. with mild steal syndrome associated with left upper arm hemodialysis graft and severe right internal carotid artery stenosis with recent left hemispheric stroke. She remains stable without recent symptoms of stroke. She is unable to hold Plavix until 6 months from her [facility] in July. Recommend proceeding with RIGHT CAROTID ENDARTERECTOMY after that time.   Will have her follow up in January for reevaluation - will likely schedule the procedure at that time.     In regard to her left arm symptoms, her left subclavian artery is patent without stenosis on CTA and left arm arterial ultrasound shows no focal stenosis or occlusion. PPGs obtained during testing demonstrate blunted waveforms to the digits of both hands but the waveforms are symmetric.  This suggests that she is appropriately accommodating to the graft. As she appears minimally impacted by her symptoms, I do not recommend left arm graft ligation.   She strongly states that she doesn't want to abandon the access given the alternatives and the implications.  She has been successfully using her graft for the past month. Agree with proceeding with removal of her tunneled catheter.   I told her to let us know if there is any worsening of symptoms.         1. Steal syndrome as complication of dialysis access, initial encounter    2. Numbness and tingling in left hand    3. Bilateral carotid artery occlusion    4. Cerebrovascular accident (CVA) due to embolism of left middle cerebral artery    5. ESRD (end stage renal disease)          Imaging Obtained/Reviewed   Study: left lower extremity arterial duplex and  "photoplethysmograph (PPGs)  Date:   10/8/24        Medical History     Past Medical History:   Diagnosis Date    Anemia     Carotid artery stenosis     Coronary artery disease     Diabetes mellitus, type 2     Disorder of kidney and ureter     ESRD (end stage renal disease)     Hyperlipidemia     Hypertension     PVD (peripheral vascular disease)     Renal disorder     Seizures     Sleep apnea, unspecified     Thyroid disease     Transfusion reaction      Past Surgical History:   Procedure Laterality Date    APPENDECTOMY      CHOLECYSTECTOMY      EYE SURGERY      HEEL SPUR EXCISION      HYSTERECTOMY      KNEE ARTHROSCOPY Left     PLACEMENT OF ARTERIOVENOUS GRAFT      [place]    PLACEMENT OF ARTERIOVENOUS GRAFT Left 07/23/2024    SHOULDER SURGERY      SPINE SURGERY      THROMBECTOMY Left 09/29/2023    Procedure: THROMBECTOMY;  Surgeon: Cristiano Estevez DO;  Location: [facility] CATH LAB;  Service: Peripheral Vascular;  Laterality: Left;  forearm graft    THYROID SURGERY       Family History   Problem Relation Name Age of Onset    Kidney disease Mother      Alzheimer's disease Mother      Heart disease Father      Hyperlipidemia Brother      Hypertension Brother      Diabetes Brother       Social History     Socioeconomic History    Marital status:      Spouse name: Sadiq SalcidoJoshua   Tobacco Use    Smoking status: Never    Smokeless tobacco: Never   Substance and Sexual Activity    Alcohol use: Not Currently    Drug use: Never    Sexual activity: Not Currently   Social History Narrative    Caregiver      Current Outpatient Medications   Medication Instructions    aspirin 81 MG Chew 1 tablet    BD INSULIN SYRINGE ULTRA-FINE 0.5 mL 31 gauge x 5/16" Syrg 1 INSULIN SYRINGE AS NEEDED SUBCUTANEOUS THREE TIMES A DAY 30 DAYS    BD ULTRA-FINE MINI PEN NEEDLE 31 gauge x 3/16" Ndle USE AS DIRECTED 4 TIMES A DAY    cyclobenzaprine (FLEXERIL) 10 mg, 3 times daily PRN    diazePAM (VALIUM) 5 MG tablet TAKE 1 TABLET BY MOUTH 30 " MINUTES PRIOR TO MRI    diclofenac sodium (VOLTAREN) 1 % Gel APPLY 4 GRAMS 4 TIMES A DAY    insulin glargine U-100 (Lantus) (LANTUS U-100 INSULIN) 20 Units, Nightly    insulin lispro 100 unit/mL injection Inject into the skin. Sliding scale    levETIRAcetam (KEPPRA) 500 mg, Oral, 2 times daily, Give postdialysis on dialysis days    linaCLOtide (LINZESS) 145 mcg, Oral, Daily PRN    metoprolol tartrate (LOPRESSOR) 50 mg, 2 times daily    nitrofurantoin, macrocrystal-monohydrate, (MACROBID) 100 MG capsule 100 mg, 2 times daily    ondansetron (ZOFRAN-ODT) 4 MG TbDL 1 tablet on the tongue and allow to dissolve Orally every 8 hors PRN for pain for 30 day(s)    oxybutynin (DITROPAN-XL) 5 mg, Nightly    oxyCODONE (ROXICODONE) 10 mg, 3 times daily    oxyCODONE-acetaminophen (PERCOCET) 5-325 mg per tablet 1 tablet, Every 6 hours PRN    pantoprazole (PROTONIX) 40 mg, Daily    PLAVIX 75 mg tablet 1 tablet    rosuvastatin (CRESTOR) 40 mg, Nightly    semaglutide (OZEMPIC SUBQ) 1 mg, Weekly    TRUE METRIX GLUCOSE TEST STRIP Strp AS DIRECTED IN VITRO THREE TIMES A DAY 90 DAYS    TRUEPLUS LANCETS 33 gauge Misc 3 times daily     Review of patient's allergies indicates:   Allergen Reactions    Adhesive tape-silicones Rash    Hydrocodone-acetaminophen Nausea And Vomiting and Rash    Adhesive Rash    Nsaids (non-steroidal anti-inflammatory drug)      Other reaction(s): kidney issues    Opioids - morphine analogues Other (See Comments)     Altered mental status    Toradol [ketorolac] Other (See Comments)     Pt with h/o ESRD      Dulaglutide Nausea And Vomiting       Patient Care Team:  Rivka Vazquez NP as PCP - General (Nurse Practitioner)  Rafi Machado MD as Consulting Physician (Nephrology)  Gracie Redd (Dialysis Center)  Braulio Dumont MD as Consulting Physician (General Surgery)  Jasvir Ascencio MD as Consulting Physician (Cardiology)        No follow-ups on file. In addition to their scheduled follow up, the patient  has also been instructed to follow up on as needed basis.     Future Appointments   Date Time Provider Department Center   12/31/2024  2:20 PM Heri Hester MD [facility]

## 2024-11-05 ENCOUNTER — OFFICE VISIT (OUTPATIENT)
Dept: VASCULAR SURGERY | Facility: CLINIC | Age: 54
End: 2024-11-05
Payer: MEDICARE

## 2024-11-05 VITALS
HEIGHT: 66 IN | HEART RATE: 83 BPM | BODY MASS INDEX: 33.27 KG/M2 | WEIGHT: 207 LBS | DIASTOLIC BLOOD PRESSURE: 54 MMHG | SYSTOLIC BLOOD PRESSURE: 93 MMHG

## 2024-11-05 DIAGNOSIS — T82.898A STEAL SYNDROME AS COMPLICATION OF DIALYSIS ACCESS, INITIAL ENCOUNTER: Primary | ICD-10-CM

## 2024-11-05 DIAGNOSIS — I63.412 CEREBROVASCULAR ACCIDENT (CVA) DUE TO EMBOLISM OF LEFT MIDDLE CEREBRAL ARTERY: ICD-10-CM

## 2024-11-05 DIAGNOSIS — N18.6 ESRD (END STAGE RENAL DISEASE): ICD-10-CM

## 2024-11-05 DIAGNOSIS — I65.23 BILATERAL CAROTID ARTERY OCCLUSION: ICD-10-CM

## 2024-11-05 DIAGNOSIS — R20.0 NUMBNESS AND TINGLING IN LEFT HAND: ICD-10-CM

## 2024-11-05 DIAGNOSIS — R20.2 NUMBNESS AND TINGLING IN LEFT HAND: ICD-10-CM

## 2024-11-05 PROCEDURE — 1159F MED LIST DOCD IN RCRD: CPT | Mod: CPTII,,, | Performed by: SPECIALIST

## 2024-11-05 PROCEDURE — 99213 OFFICE O/P EST LOW 20 MIN: CPT | Mod: ,,, | Performed by: SPECIALIST

## 2024-11-05 PROCEDURE — 3044F HG A1C LEVEL LT 7.0%: CPT | Mod: CPTII,,, | Performed by: SPECIALIST

## 2024-11-05 PROCEDURE — 3078F DIAST BP <80 MM HG: CPT | Mod: CPTII,,, | Performed by: SPECIALIST

## 2024-11-05 PROCEDURE — 3074F SYST BP LT 130 MM HG: CPT | Mod: CPTII,,, | Performed by: SPECIALIST

## 2024-11-05 PROCEDURE — 1160F RVW MEDS BY RX/DR IN RCRD: CPT | Mod: CPTII,,, | Performed by: SPECIALIST

## 2024-11-05 PROCEDURE — 3008F BODY MASS INDEX DOCD: CPT | Mod: CPTII,,, | Performed by: SPECIALIST

## 2024-11-05 RX ORDER — NITROFURANTOIN 25; 75 MG/1; MG/1
100 CAPSULE ORAL 2 TIMES DAILY
COMMUNITY
Start: 2024-11-04

## 2024-12-26 NOTE — PROGRESS NOTES
"    [facility] Vascular - Clinic Note  Heri Hester MD      Patient Name: Alycia Lewis                   : 1970      MRN: [id number]   Visit Date: 2024       History Present Illness     Reason for Visit: History and Physical Update    Ms. Lewis presents to the clinic for history and physical update in preparation for carotid intervention. Per cardiology, surgery was postponed until she was 6 months post LHC done on 2024. She denies recent signs or symptoms of stroke or TIA. She denies chest pain or shortness of breath.     She is dialyzing via left arm graft without issues. She reports discoloration to left pinky finger. She reports constant numbness to left hand. She is able to move her hand without significant pain. Her tunneled catheter has been removed by [place].       REVIEW OF SYSTEMS:  Review of systems conducted, negative except as stated in the history of present illness. See HPI for details.        Physical Exam      Vitals:    24 0907   BP: (!) 148/71   BP Location: Right arm   Patient Position: Sitting   Pulse: 86   Weight: 98 kg (216 lb 0.8 oz)   Height: 5' 6" (1.676 m)          General: well-nourished, no acute distress, and healthy appearing, in a wheelchair, alert, pleasant, conversant, and oriented  Neurologic: cranial nerves are grossly intact marginal mandibular of the facial nerve and hypoglossal nerve demonstrates appropriate function, no neurologic deficits, no motor deficits, and no sensory deficits  HEENT: grossly normal and no scleral icterus  Neck/Chest: normal  and soft without lymphadenopathy  Respiratory: breathing easily and without respiratory distress  Abdomen: normal and soft  Cardiology: regular rate and rhythm    Upper Extremity Arterial Exam:   Right - radial is palpable  Left - radial is present with doppler, palmar arch is present, and 5th digit is present    Musculoskeletal:   Upper Extremity: decreased function to left hand, left hand " is warm  Lower Extremity: no edema present to bilateral lower extremities                Assessment and Plan     Ms. Lewis is a 54 y.o. with severe right internal carotid artery and recent left hemispheric stroke. As she is 6 months out from her most recent [facility] and has no current concerns for cardiac event, recommend proceeding with RIGHT CAROTID ENDARTERECTOMY. We discussed the risks and benefits of carotid endarterectomy including but not limited to bleeding, stroke, cranial nerve injury, myocardial infarction, and/or infection.  Her vessels are somewhat small.  Also discussed the increased risk of stroke without surgical intervention. She wishes to proceed.    In regard to her focal discoloration to PIP joint on her left pointer finger, there is the appearance of bruising but no clear signs of ischemia. Doppler signals are adequate to her hand and she is not having any significant pain.       1. Stenosis of right carotid artery  - Basic Metabolic Panel; Future  - CBC Auto Differential; Future  - EKG 12-lead; Future  - X-Ray Chest PA And Lateral Pre-OP; Future    2. Cerebrovascular accident (CVA) due to embolism of left middle cerebral artery    3. Steal syndrome as complication of dialysis access, initial encounter    4. ESRD (end stage renal disease)          Imaging Obtained/Reviewed         Medical History     Past Medical History:   Diagnosis Date    Anemia     Carotid artery stenosis     Coronary artery disease     Diabetes mellitus, type 2     Disorder of kidney and ureter     ESRD (end stage renal disease)     Hyperlipidemia     Hypertension     PVD (peripheral vascular disease)     Renal disorder     Seizures     Sleep apnea, unspecified     Thyroid disease     Transfusion reaction      Past Surgical History:   Procedure Laterality Date    APPENDECTOMY      CHOLECYSTECTOMY      EYE SURGERY      HEEL SPUR EXCISION      HYSTERECTOMY      KNEE ARTHROSCOPY Left     PLACEMENT OF ARTERIOVENOUS GRAFT       "[place]    PLACEMENT OF ARTERIOVENOUS GRAFT Left 07/23/2024    SHOULDER SURGERY      SPINE SURGERY      THROMBECTOMY Left 09/29/2023    Procedure: THROMBECTOMY;  Surgeon: Cristiano Estevez DO;  Location: [facility] CATH LAB;  Service: Peripheral Vascular;  Laterality: Left;  forearm graft    THYROID SURGERY       Family History   Problem Relation Name Age of Onset    Kidney disease Mother      Alzheimer's disease Mother      Heart disease Father      Hyperlipidemia Brother      Hypertension Brother      Diabetes Brother       Social History     Socioeconomic History    Marital status:      Spouse name: Sadiq Lewis   Tobacco Use    Smoking status: Never    Smokeless tobacco: Never   Substance and Sexual Activity    Alcohol use: Not Currently    Drug use: Never    Sexual activity: Not Currently   Social History Narrative    Caregiver      Current Outpatient Medications   Medication Instructions    aspirin 81 MG Chew 1 tablet    BD INSULIN SYRINGE ULTRA-FINE 0.5 mL 31 gauge x 5/16" Syrg 1 INSULIN SYRINGE AS NEEDED SUBCUTANEOUS THREE TIMES A DAY 30 DAYS    BD ULTRA-FINE MINI PEN NEEDLE 31 gauge x 3/16" Ndle USE AS DIRECTED 4 TIMES A DAY    cyclobenzaprine (FLEXERIL) 10 mg, 3 times daily PRN    diazePAM (VALIUM) 5 MG tablet TAKE 1 TABLET BY MOUTH 30 MINUTES PRIOR TO MRI    diclofenac sodium (VOLTAREN) 1 % Gel APPLY 4 GRAMS 4 TIMES A DAY    insulin glargine U-100 (Lantus) (LANTUS U-100 INSULIN) 20 Units, Nightly    insulin lispro 100 unit/mL injection Inject into the skin. Sliding scale    levETIRAcetam (KEPPRA) 500 mg, Oral, 2 times daily, Give postdialysis on dialysis days    linaCLOtide (LINZESS) 145 mcg, Oral, Daily PRN    metoprolol tartrate (LOPRESSOR) 50 mg, 2 times daily    nitrofurantoin, macrocrystal-monohydrate, (MACROBID) 100 MG capsule 100 mg, 2 times daily    ondansetron (ZOFRAN-ODT) 4 MG TbDL 1 tablet on the tongue and allow to dissolve Orally every 8 hors PRN for pain for 30 day(s)    oxybutynin " (DITROPAN-XL) 5 mg, Nightly    oxyCODONE (ROXICODONE) 10 mg, 3 times daily    oxyCODONE-acetaminophen (PERCOCET) 5-325 mg per tablet 1 tablet, Every 6 hours PRN    pantoprazole (PROTONIX) 40 mg, Daily    PLAVIX 75 mg tablet 1 tablet    rosuvastatin (CRESTOR) 40 mg, Nightly    semaglutide (OZEMPIC SUBQ) 1 mg, Weekly    TRUE METRIX GLUCOSE TEST STRIP Strp AS DIRECTED IN VITRO THREE TIMES A DAY 90 DAYS    TRUEPLUS LANCETS 33 gauge Misc 3 times daily     Review of patient's allergies indicates:   Allergen Reactions    Adhesive tape-silicones Rash    Hydrocodone-acetaminophen Nausea And Vomiting and Rash    Adhesive Rash    Nsaids (non-steroidal anti-inflammatory drug)      Other reaction(s): kidney issues    Opioids - morphine analogues Other (See Comments)     Altered mental status    Toradol [ketorolac] Other (See Comments)     Pt with h/o ESRD      Dulaglutide Nausea And Vomiting       Patient Care Team:  Rivka Vazquez NP as PCP - General (Nurse Practitioner)  Rafi Machado MD as Consulting Physician (Nephrology)  Gracie Redd (Dialysis Center)  Braulio Dumont MD as Consulting Physician (General Surgery)  Jasvir Ascencio MD as Consulting Physician (Cardiology)        No follow-ups on file. In addition to their scheduled follow up, the patient has also been instructed to follow up on as needed basis.     No future appointments.

## 2024-12-31 ENCOUNTER — OFFICE VISIT (OUTPATIENT)
Dept: VASCULAR SURGERY | Facility: CLINIC | Age: 54
End: 2024-12-31
Payer: MEDICARE

## 2024-12-31 VITALS
WEIGHT: 216.06 LBS | SYSTOLIC BLOOD PRESSURE: 148 MMHG | BODY MASS INDEX: 34.72 KG/M2 | HEIGHT: 66 IN | DIASTOLIC BLOOD PRESSURE: 71 MMHG | HEART RATE: 86 BPM

## 2024-12-31 DIAGNOSIS — I65.23 BILATERAL CAROTID ARTERY STENOSIS: ICD-10-CM

## 2024-12-31 DIAGNOSIS — I65.21 STENOSIS OF RIGHT CAROTID ARTERY: Primary | ICD-10-CM

## 2024-12-31 DIAGNOSIS — T82.898A STEAL SYNDROME AS COMPLICATION OF DIALYSIS ACCESS, INITIAL ENCOUNTER: ICD-10-CM

## 2024-12-31 DIAGNOSIS — N18.6 ESRD (END STAGE RENAL DISEASE): ICD-10-CM

## 2024-12-31 DIAGNOSIS — I63.412 CEREBROVASCULAR ACCIDENT (CVA) DUE TO EMBOLISM OF LEFT MIDDLE CEREBRAL ARTERY: ICD-10-CM

## 2024-12-31 RX ORDER — OXYCODONE AND ACETAMINOPHEN 5; 325 MG/1; MG/1
1 TABLET ORAL EVERY 4 HOURS PRN
Qty: 15 TABLET | Refills: 0 | Status: SHIPPED | OUTPATIENT
Start: 2025-01-15

## 2024-12-31 RX ORDER — SODIUM CHLORIDE 9 MG/ML
INJECTION, SOLUTION INTRAVENOUS CONTINUOUS
OUTPATIENT
Start: 2024-12-31

## 2025-02-03 ENCOUNTER — TELEPHONE (OUTPATIENT)
Dept: VASCULAR SURGERY | Facility: CLINIC | Age: 55
End: 2025-02-03
Payer: MEDICARE

## 2025-02-03 NOTE — TELEPHONE ENCOUNTER
Patient was scheduled for a carotid endarterectomy with Dr. Hester on 2/5/2025. Her insurance originally denied her surgery due to end stage renal disease diagnosis. Our billing department communicated with her multiple times to have her come in to sign an appeal. Billing was able to determine that the physician could sign it. This was determined on Friday 1/31/2025. During mid conversation with patient when trying to schedule her a H&P update the phone was disconnected. I attempted to contact her and a family member multiple times on Friday 1/31/2025. I was attempting to also notify patient to continue holding plavix prior to surgery in the event that insurance was approved but was unsuccessful. Spoke with patient this morning and she stated that she had not been holding her blood thinner. Patient was cancelled on 2/5 for surgery and scheduled for H&P update.

## 2025-02-21 ENCOUNTER — TELEPHONE (OUTPATIENT)
Dept: VASCULAR SURGERY | Facility: CLINIC | Age: 55
End: 2025-02-21
Payer: MEDICARE

## 2025-04-09 NOTE — PROGRESS NOTES
"    [facility] - Clinic Note  Heri Hester MD      Patient Name: Alycia Lewis                   : 1970      MRN: [id number]   Visit Date: 4/15/2025       History Present Illness     Reason for Visit: History and Physical Update    Ms. Lewis presents to the clinic for history and physical update in preparation for carotid intervention. She was originally postponed secondary to clearance to hold Plavix following cardiac intervention in July. More recently she was postponed because of weather conditions and insurance authorization. She denies recent signs or symptoms of stroke or TIA. She denies chest pain or shortness of breath.      She is dialyzing via left arm graft. She reports an infiltration last week which happened with initial stick. She does try to do her sticks at the dialysis unit as she intends to return to home hemodialysis. She reports discoloration to left pinky finger. She reports constant numbness to left hand. She is able to move her hand without significant pain. She does range of motion exercises and feels like this is helping.       REVIEW OF SYSTEMS:  Review of systems conducted, negative except as stated in the history of present illness. See HPI for details.        Physical Exam      Vitals:    04/15/25 1011   BP: (!) 134/56   BP Location: Right arm   Patient Position: Sitting   Pulse: 81   Weight: 90 kg (198 lb 6.6 oz)   Height: 5' 7" (1.702 m)          General: well-nourished, no acute distress, and healthy appearing, in a wheelchair, alert, pleasant, conversant, and oriented  Neurologic: cranial nerves are grossly intact marginal mandibular of the facial nerve and hypoglossal nerve demonstrates appropriate function, no neurologic deficits, no motor deficits, and no sensory deficits  HEENT: grossly normal and no scleral icterus  Neck/Chest: normal  and soft without lymphadenopathy  Respiratory: breathing easily and without respiratory distress  Abdomen: " "normal and soft  Cardiology: regular rate and rhythm    Upper Extremity Arterial Exam:   Left - radial is lightly palpable    Dialysis Access:  right chest wall tunneled catheter  left upper arm graft  Assessment: good thrill, no pulsatility    Musculoskeletal:   Upper Extremity: normal bilateral hand function; Good left hand flexion and extension; No hand/finger discoloration;   Lower Extremity: no edema present to bilateral lower extremities            Assessment and Plan     Ms. Lewis is a 54 y.o. with severe right internal carotid artery and previous left hemispheric stroke.  Recommend proceeding with RIGHT CAROTID ENDARTERECTOMY. We discussed the risks and benefits of carotid endarterectomy including but not limited to bleeding, stroke, cranial nerve injury, myocardial infarction, and/or infection.  Her vessels are somewhat small.  Also discussed the increased risk of stroke without surgical intervention. She wishes to proceed but wants the last week of May due to work related scheduling.      On exam, her graft feels healthy overall but if she continues to have infiltration issues, can consider a fistulogram.      Her hand issues are largely improved and hand appears well perfused today.         1. Stenosis of right carotid artery  - Basic Metabolic Panel; Future  - CBC Auto Differential; Future  - EKG 12-lead; Future  - X-Ray Chest PA And Lateral Pre-OP; Future    2. Bilateral carotid artery stenosis    3. ESRD (end stage renal disease)          Imaging Obtained/Reviewed           Medical History     Past Medical History:   Diagnosis Date    Anemia     Carotid artery stenosis     Coronary artery disease     CVA (cerebral vascular accident) 08/2024    Diabetes mellitus, type 2     Disorder of kidney and ureter     Dysphagia     ESRD (end stage renal disease) on dialysis     M&F    General anesthetics causing adverse effect in therapeutic use     "I do not wake up, have had to be re-intibated"    GERD " "(gastroesophageal reflux disease)     Glaucoma     Gout     History of heart murmur in childhood     Hyperlipidemia     Hypertension     not on medications since dialysis    Insomnia     OAB (overactive bladder)     PVD (peripheral vascular disease)     Renal disorder     Seizures     Sleep apnea, unspecified     does not use CPAP    Steal syndrome of dialysis vascular access     Stenosis of right carotid artery     Thyroid nodule     Transfusion reaction     Uses walker     Uses wheelchair      Past Surgical History:   Procedure Laterality Date    APPENDECTOMY      CATARACT EXTRACTION Bilateral     CHOLECYSTECTOMY      COLONOSCOPY      CORONARY STENT PLACEMENT      ESOPHAGOGASTRODUODENOSCOPY      GLAUCOMA SURGERY Bilateral     HEEL SPUR EXCISION Left     HYSTERECTOMY      KNEE ARTHROSCOPY W/ MENISCAL REPAIR Left     OPEN REDUCTION AND INTERNAL FIXATION (ORIF) OF FRACTURE OF LOWER LEG Left     OPEN REDUCTION AND INTERNAL FIXATION (ORIF) OF INJURY OF ANKLE Right     PLACEMENT OF ARTERIOVENOUS GRAFT      [place]    PLACEMENT OF ARTERIOVENOUS GRAFT Left 07/23/2024    RADIOFREQUENCY ABLATION, NERVE, SPINAL, LUMBOSACRAL      multiple    ROTATOR CUFF REPAIR Bilateral     L x1 with bicep repair; R x2    SPINE SURGERY      THROMBECTOMY Left 09/29/2023    Procedure: THROMBECTOMY;  Surgeon: Cristiano Estevez DO;  Location: [facility] CATH LAB;  Service: Peripheral Vascular;  Laterality: Left;  forearm graft    THYROIDECTOMY, PARTIAL      TRIGGER FINGER RELEASE Left     thumb     Family History   Problem Relation Name Age of Onset    Kidney disease Mother      Alzheimer's disease Mother      Heart disease Father      Hyperlipidemia Brother      Hypertension Brother      Diabetes Brother       Social History[1]  Current Outpatient Medications   Medication Instructions    aspirin 81 mg Cap 1 tablet, Oral, Daily    BD INSULIN SYRINGE ULTRA-FINE 0.5 mL 31 gauge x 5/16" Syrg 1 INSULIN SYRINGE AS NEEDED SUBCUTANEOUS THREE TIMES A DAY 30 DAYS " "   BD ULTRA-FINE MINI PEN NEEDLE 31 gauge x 3/16" Ndle USE AS DIRECTED 4 TIMES A DAY    cyclobenzaprine (FLEXERIL) 10 mg, Oral, 3 times daily PRN    diazePAM (VALIUM) 5 MG tablet TAKE 1 TABLET BY MOUTH 30 MINUTES PRIOR TO MRI    diclofenac sodium (VOLTAREN) 1 % Gel Topical (Top), 4 times daily PRN    insulin glargine U-100 (Lantus) (LANTUS U-100 INSULIN) 20 Units, Subcutaneous, Nightly    insulin lispro 100 unit/mL injection Subcutaneous, 3 times daily before meals, Sliding scale    levETIRAcetam (KEPPRA) 500 mg, Oral, 2 times daily, Give postdialysis on dialysis days    linaCLOtide (LINZESS) 145 mcg, Oral, Daily PRN    metoprolol tartrate (LOPRESSOR) 50 mg, Oral, Daily    nitrofurantoin, macrocrystal-monohydrate, (MACROBID) 100 MG capsule 100 mg, Oral, 2 times daily    ondansetron (ZOFRAN-ODT) 4 MG TbDL 1 tablet on the tongue and allow to dissolve Orally every 8 hors PRN for pain for 30 day(s)    oxybutynin (DITROPAN-XL) 5 mg, Oral, Nightly    oxyCODONE (ROXICODONE) 10 mg, Oral, Every 8 hours PRN    oxyCODONE-acetaminophen (PERCOCET) 5-325 mg per tablet 1 tablet, Oral, Every 4 hours PRN    oxyCODONE-acetaminophen (PERCOCET) 7.5-325 mg per tablet 1 tablet, Oral, Every 6 hours PRN    pantoprazole (PROTONIX) 40 mg, Oral, Daily    PLAVIX 75 mg tablet 1 tablet, Oral, Daily    rosuvastatin (CRESTOR) 40 mg, Oral, Nightly    semaglutide (OZEMPIC SUBQ) 1 mg, Subcutaneous, Weekly    TRUE METRIX GLUCOSE TEST STRIP Strp AS DIRECTED IN VITRO THREE TIMES A DAY 90 DAYS    TRUEPLUS LANCETS 33 gauge Misc Topical (Top), 3 times daily     Review of patient's allergies indicates:   Allergen Reactions    Adhesive tape-silicones Rash    Hydrocodone-acetaminophen Nausea And Vomiting and Rash    Adhesive Rash    Latex, natural rubber      Steri-strips, silk tape,    Nsaids (non-steroidal anti-inflammatory drug)      Other reaction(s): kidney issues    Opioids - morphine analogues Other (See Comments)     Altered mental status    Toradol " [ketorolac] Other (See Comments)     Pt with h/o ESRD      Dulaglutide Nausea And Vomiting       Patient Care Team:  Rivka Vazquez NP as PCP - General (Nurse Practitioner)  Rafi Machado MD as Consulting Physician (Nephrology)  Gracie Redd (Dialysis Center)  Braulio Dumont MD as Consulting Physician (General Surgery)  Jasvir Ascencio MD as Consulting Physician (Cardiology)  Heri Hester MD as Vascular Surgery (Vascular Surgery)        No follow-ups on file. In addition to their scheduled follow up, the patient has also been instructed to follow up on as needed basis.     No future appointments.            [1]   Social History  Socioeconomic History    Marital status:      Spouse name: Sadiq Lewis   Tobacco Use    Smoking status: Never    Smokeless tobacco: Never   Substance and Sexual Activity    Alcohol use: Not Currently    Drug use: Never    Sexual activity: Not Currently   Social History Narrative    Caregiver

## 2025-04-15 ENCOUNTER — OFFICE VISIT (OUTPATIENT)
Dept: VASCULAR SURGERY | Facility: CLINIC | Age: 55
End: 2025-04-15
Payer: MEDICARE

## 2025-04-15 VITALS
WEIGHT: 198.44 LBS | BODY MASS INDEX: 31.15 KG/M2 | HEART RATE: 81 BPM | DIASTOLIC BLOOD PRESSURE: 56 MMHG | SYSTOLIC BLOOD PRESSURE: 134 MMHG | HEIGHT: 67 IN

## 2025-04-15 DIAGNOSIS — I65.23 BILATERAL CAROTID ARTERY STENOSIS: ICD-10-CM

## 2025-04-15 DIAGNOSIS — I65.21 STENOSIS OF RIGHT CAROTID ARTERY: Primary | ICD-10-CM

## 2025-04-15 DIAGNOSIS — N18.6 ESRD (END STAGE RENAL DISEASE): ICD-10-CM

## 2025-04-15 PROCEDURE — 3075F SYST BP GE 130 - 139MM HG: CPT | Mod: CPTII,,, | Performed by: SPECIALIST

## 2025-04-15 PROCEDURE — 1159F MED LIST DOCD IN RCRD: CPT | Mod: CPTII,,, | Performed by: SPECIALIST

## 2025-04-15 PROCEDURE — 3078F DIAST BP <80 MM HG: CPT | Mod: CPTII,,, | Performed by: SPECIALIST

## 2025-04-15 PROCEDURE — 1160F RVW MEDS BY RX/DR IN RCRD: CPT | Mod: CPTII,,, | Performed by: SPECIALIST

## 2025-04-15 PROCEDURE — 3008F BODY MASS INDEX DOCD: CPT | Mod: CPTII,,, | Performed by: SPECIALIST

## 2025-04-15 PROCEDURE — 99213 OFFICE O/P EST LOW 20 MIN: CPT | Mod: ,,, | Performed by: SPECIALIST

## 2025-05-05 NOTE — PROGRESS NOTES
"    [facility] - Clinic Note  Heri Hester MD      Patient Name: Alycia Lewis                   : 1970      MRN: [id number]   Visit Date: 2025       History Present Illness     Reason for Visit: Carotid Artery Disease and History and Physical Update    Ms. Lewis presents to the clinic for history and physical update in preparation of carotid intervention. She has previously postponed surgery due to personal scheduling conflicts. She denies recent signs or symptoms of stroke or TIA.     She continues to use her left arm graft for hemodialysis. Denies recent issues. She is nauseated today and did vomit x 3 earlier this morning. She feels like her electrolytes are off. She reports some recent hypotension issues. She states Dr. Machado made some adjustments recently to stop removing fluid with her treatments. She was in the ED this weekend for dehydration.     She states she has a CT of her leg scheduled this week due to a large mass that is only present when she is laying down. She has noticed some change in this over the last 6 months.       She reports intermittent episodes of emesis. She is having nausea and emesis today.    REVIEW OF SYSTEMS:  Review of systems conducted, negative except as stated in the history of present illness. See HPI for details.        Physical Exam      Vitals:    25 1127   BP: 117/65   BP Location: Right arm   Patient Position: Sitting   Pulse: 93   Weight: 90 kg (198 lb 6.6 oz)   Height: 5' 7" (1.702 m)          General: well-nourished, no acute distress, and healthy appearing, in a wheelchair, alert, pleasant, conversant, and oriented  Neurologic: cranial nerves are grossly intact, no neurologic deficits, no motor deficits, and no sensory deficits  HEENT: grossly normal and no scleral icterus  Neck/Chest: normal  and soft without lymphadenopathy  Respiratory: breathing easily and without respiratory distress  Abdomen: normal and " "soft  Cardiology: regular rate and rhythm    Upper Extremity Arterial Exam:   Right - radial is palpable  Left - radial is palpable    Musculoskeletal:   Upper Extremity: left hand functional  Lower Extremity: no edema present to bilateral lower extremities            Assessment and Plan     Ms. Lewis is a 54 y.o. woman with severe right internal carotid artery and previous left hemispheric stroke.  Recommend proceeding with RIGHT CAROTID ENDARTERECTOMY. We discussed the risks and benefits of carotid endarterectomy including but not limited to bleeding, stroke, cranial nerve injury, myocardial infarction, and/or infection.  Her vessels are somewhat small.     Also discussed the increased risk of stroke without surgical intervention. She is ready to proceed.    Her most recent imaging is from August 2024. Will need a preoperative carotid duplex.         1. Stenosis of right carotid artery  - CV Ultrasound Bilateral Doppler Carotid; Future    2. Bilateral carotid artery stenosis    3. ESRD (end stage renal disease)            Medical History     Past Medical History:   Diagnosis Date    Anemia     Carotid artery stenosis     Coronary artery disease     CVA (cerebral vascular accident) 08/2024    Diabetes mellitus, type 2     Disorder of kidney and ureter     Dysphagia     ESRD (end stage renal disease) on dialysis     M&F    General anesthetics causing adverse effect in therapeutic use     "I do not wake up, have had to be re-intibated"    GERD (gastroesophageal reflux disease)     Glaucoma     Gout     History of heart murmur in childhood     Hyperlipidemia     Hypertension     not on medications since dialysis    Insomnia     OAB (overactive bladder)     PVD (peripheral vascular disease)     Renal disorder     Seizures     Sleep apnea, unspecified     does not use CPAP    Steal syndrome of dialysis vascular access     Stenosis of right carotid artery     Thyroid nodule     Transfusion reaction     Uses walker  " "   Uses wheelchair      Past Surgical History:   Procedure Laterality Date    APPENDECTOMY      CATARACT EXTRACTION Bilateral     CHOLECYSTECTOMY      COLONOSCOPY      CORONARY STENT PLACEMENT      ESOPHAGOGASTRODUODENOSCOPY      GLAUCOMA SURGERY Bilateral     HEEL SPUR EXCISION Left     HYSTERECTOMY      KNEE ARTHROSCOPY W/ MENISCAL REPAIR Left     OPEN REDUCTION AND INTERNAL FIXATION (ORIF) OF FRACTURE OF LOWER LEG Left     OPEN REDUCTION AND INTERNAL FIXATION (ORIF) OF INJURY OF ANKLE Right     PLACEMENT OF ARTERIOVENOUS GRAFT      [place]    PLACEMENT OF ARTERIOVENOUS GRAFT Left 07/23/2024    RADIOFREQUENCY ABLATION, NERVE, SPINAL, LUMBOSACRAL      multiple    ROTATOR CUFF REPAIR Bilateral     L x1 with bicep repair; R x2    SPINE SURGERY      THROMBECTOMY Left 09/29/2023    Procedure: THROMBECTOMY;  Surgeon: Cristiano Estevez DO;  Location: [facility] CATH LAB;  Service: Peripheral Vascular;  Laterality: Left;  forearm graft    THYROIDECTOMY, PARTIAL      TRIGGER FINGER RELEASE Left     thumb     Family History   Problem Relation Name Age of Onset    Kidney disease Mother      Alzheimer's disease Mother      Heart disease Father      Hyperlipidemia Brother      Hypertension Brother      Diabetes Brother       Social History[1]  Current Outpatient Medications   Medication Instructions    aspirin 81 mg Cap 1 tablet, Oral, Daily    BD INSULIN SYRINGE ULTRA-FINE 0.5 mL 31 gauge x 5/16" Syrg 1 INSULIN SYRINGE AS NEEDED SUBCUTANEOUS THREE TIMES A DAY 30 DAYS    BD ULTRA-FINE MINI PEN NEEDLE 31 gauge x 3/16" Ndle USE AS DIRECTED 4 TIMES A DAY    cyclobenzaprine (FLEXERIL) 10 mg, Oral, 3 times daily PRN    diazePAM (VALIUM) 5 MG tablet TAKE 1 TABLET BY MOUTH 30 MINUTES PRIOR TO MRI    diclofenac sodium (VOLTAREN) 1 % Gel Topical (Top), 4 times daily PRN    insulin glargine U-100 (Lantus) (LANTUS U-100 INSULIN) 20 Units, Subcutaneous, Nightly    insulin lispro 100 unit/mL injection Subcutaneous, 3 times daily before meals, " Sliding scale    levETIRAcetam (KEPPRA) 500 mg, Oral, 2 times daily, Give postdialysis on dialysis days    linaCLOtide (LINZESS) 145 mcg, Oral, Daily PRN    metoclopramide HCl (REGLAN) 10 mg, 3 times daily    metoprolol tartrate (LOPRESSOR) 50 mg, Oral, Daily    nitrofurantoin, macrocrystal-monohydrate, (MACROBID) 100 MG capsule 100 mg, Oral, 2 times daily    oxybutynin (DITROPAN-XL) 5 mg, Oral, Nightly    oxyCODONE (ROXICODONE) 10 mg, Oral, Every 8 hours PRN    oxyCODONE-acetaminophen (PERCOCET) 7.5-325 mg per tablet 1 tablet, Oral, Every 6 hours PRN    pantoprazole (PROTONIX) 40 mg, Oral, Daily    PLAVIX 75 mg tablet 1 tablet, Oral, Daily    rosuvastatin (CRESTOR) 40 mg, Oral, Nightly    semaglutide (OZEMPIC SUBQ) 1 mg, Subcutaneous, Weekly    TRUE METRIX GLUCOSE TEST STRIP Strp AS DIRECTED IN VITRO THREE TIMES A DAY 90 DAYS    TRUEPLUS LANCETS 33 gauge Misc Topical (Top), 3 times daily     Review of patient's allergies indicates:   Allergen Reactions    Adhesive tape-silicones Rash    Hydrocodone-acetaminophen Nausea And Vomiting and Rash    Adhesive Rash    Latex, natural rubber      Steri-strips, silk tape,    Nsaids (non-steroidal anti-inflammatory drug)      Other reaction(s): kidney issues    Opioids - morphine analogues Other (See Comments)     Altered mental status    Toradol [ketorolac] Other (See Comments)     Pt with h/o ESRD      Dulaglutide Nausea And Vomiting       Patient Care Team:  Rivka Vazquez NP as PCP - General (Nurse Practitioner)  Rafi Machado MD as Consulting Physician (Nephrology)  rGacie Redd (Dialysis Center)  Braulio Dumont MD as Consulting Physician (General Surgery)  Jasvir Ascencio MD as Consulting Physician (Cardiology)  Heri Hester MD as Vascular Surgery (Vascular Surgery)        No follow-ups on file. In addition to their scheduled follow up, the patient has also been instructed to follow up on as needed basis.     Future Appointments   Date Time Provider  Department Center   7/10/2025  7:45 AM CV [facility] VASCULAR [facility] VASSUR [facility]   7/10/2025  8:40 AM Heri Hester MD [facility]             [1]   Social History  Socioeconomic History    Marital status:      Spouse name: Sadiq Lewis   Tobacco Use    Smoking status: Never    Smokeless tobacco: Never   Substance and Sexual Activity    Alcohol use: Not Currently    Drug use: Never    Sexual activity: Not Currently   Social History Narrative    Caregiver

## 2025-05-06 ENCOUNTER — OFFICE VISIT (OUTPATIENT)
Dept: VASCULAR SURGERY | Facility: CLINIC | Age: 55
End: 2025-05-06
Payer: MEDICARE

## 2025-05-06 VITALS
HEART RATE: 93 BPM | HEIGHT: 67 IN | BODY MASS INDEX: 31.15 KG/M2 | DIASTOLIC BLOOD PRESSURE: 65 MMHG | SYSTOLIC BLOOD PRESSURE: 117 MMHG | WEIGHT: 198.44 LBS

## 2025-05-06 DIAGNOSIS — I65.23 BILATERAL CAROTID ARTERY OCCLUSION: ICD-10-CM

## 2025-05-06 DIAGNOSIS — I65.21 STENOSIS OF RIGHT CAROTID ARTERY: Primary | ICD-10-CM

## 2025-05-06 DIAGNOSIS — I65.23 BILATERAL CAROTID ARTERY STENOSIS: ICD-10-CM

## 2025-05-06 DIAGNOSIS — I65.23 BILATERAL CAROTID ARTERY STENOSIS: Primary | ICD-10-CM

## 2025-05-06 DIAGNOSIS — N18.6 ESRD (END STAGE RENAL DISEASE): ICD-10-CM

## 2025-05-06 PROCEDURE — 3074F SYST BP LT 130 MM HG: CPT | Mod: CPTII,,, | Performed by: SPECIALIST

## 2025-05-06 PROCEDURE — 1159F MED LIST DOCD IN RCRD: CPT | Mod: CPTII,,, | Performed by: SPECIALIST

## 2025-05-06 PROCEDURE — 99213 OFFICE O/P EST LOW 20 MIN: CPT | Mod: ,,, | Performed by: SPECIALIST

## 2025-05-06 PROCEDURE — 1160F RVW MEDS BY RX/DR IN RCRD: CPT | Mod: CPTII,,, | Performed by: SPECIALIST

## 2025-05-06 PROCEDURE — 3008F BODY MASS INDEX DOCD: CPT | Mod: CPTII,,, | Performed by: SPECIALIST

## 2025-05-06 PROCEDURE — 3078F DIAST BP <80 MM HG: CPT | Mod: CPTII,,, | Performed by: SPECIALIST

## 2025-05-06 RX ORDER — METOCLOPRAMIDE 10 MG/1
10 TABLET ORAL 3 TIMES DAILY
COMMUNITY
Start: 2025-05-04

## 2025-05-06 RX ORDER — SODIUM CHLORIDE 9 MG/ML
INJECTION, SOLUTION INTRAVENOUS CONTINUOUS
OUTPATIENT
Start: 2025-05-06

## 2025-05-12 DIAGNOSIS — I65.23 BILATERAL CAROTID ARTERY STENOSIS: Primary | ICD-10-CM

## 2025-05-12 DIAGNOSIS — I65.29 OCCLUSION AND STENOSIS OF UNSPECIFIED CAROTID ARTERY: ICD-10-CM

## 2025-05-12 NOTE — PROGRESS NOTES
Carotid duplex obtained at last office visit 5/6/25. Results reviewed by Dr. Hester. Due to moderate velocities with inconsistently from previous testing, will obtain updated CTA neck prior to proceeding with schedule CEA.

## 2025-05-14 ENCOUNTER — ANESTHESIA EVENT (OUTPATIENT)
Dept: SURGERY | Facility: HOSPITAL | Age: 55
End: 2025-05-14
Payer: MEDICARE

## 2025-05-15 ENCOUNTER — DOCUMENTATION ONLY (OUTPATIENT)
Dept: VASCULAR SURGERY | Facility: HOSPITAL | Age: 55
End: 2025-05-15
Payer: MEDICARE

## 2025-05-15 NOTE — PROGRESS NOTES
CTA reviewed.  Bilateral severe carotid artery stenosis noted - calcific plaque with no threatening features.  Right carotid stenosis is interpreted as 80% by radiology but appears more borderline at 65-70% to me.   Would recommend continuing with plan for right carotid endarterectomy.   (Did have previous left sided infarct that was [place]).

## 2025-05-15 NOTE — PROGRESS NOTES
Results of CTA discussed with MsYulissa Joshua. She is agrees with plan to proceed with right carotid endarterectomy as discussed in the last office visit. She states she was seen by her PCP and her cholesterol levels are improvement and she can stop her atorvastatin. I advised against this for now and to review this with Dr. Hester the day of surgery to discuss his recommendations. Discussed the use of statin therapy and its benefits in regard to the stabilization of atherosclerotic plaque. For now advise she continue aspirin and statin therapy. Plavix is to be help starting on 5/23/25 in preparation for surgery on 5/28/25. She agrees with recommendation.

## 2025-05-27 ENCOUNTER — TELEPHONE (OUTPATIENT)
Dept: VASCULAR SURGERY | Facility: CLINIC | Age: 55
End: 2025-05-27
Payer: MEDICARE

## 2025-05-28 ENCOUNTER — HOSPITAL ENCOUNTER (INPATIENT)
Facility: HOSPITAL | Age: 55
LOS: 1 days | Discharge: HOME | DRG: 037 | End: 2025-05-29
Attending: SPECIALIST | Admitting: SPECIALIST
Payer: MEDICARE

## 2025-05-28 ENCOUNTER — ANESTHESIA (OUTPATIENT)
Dept: SURGERY | Facility: HOSPITAL | Age: 55
End: 2025-05-28
Payer: MEDICARE

## 2025-05-28 DIAGNOSIS — I65.23 BILATERAL CAROTID ARTERY STENOSIS: ICD-10-CM

## 2025-05-28 LAB
ANION GAP SERPL CALC-SCNC: 9 MEQ/L
BUN SERPL-MCNC: 25.7 MG/DL (ref 9.8–20.1)
CALCIUM SERPL-MCNC: 8 MG/DL (ref 8.4–10.2)
CHLORIDE SERPL-SCNC: 102 MMOL/L (ref 98–107)
CO2 SERPL-SCNC: 31 MMOL/L (ref 22–29)
CREAT SERPL-MCNC: 3.52 MG/DL (ref 0.55–1.02)
CREAT/UREA NIT SERPL: 7
GFR SERPLBLD CREATININE-BSD FMLA CKD-EPI: 15 ML/MIN/1.73/M2
GLUCOSE SERPL-MCNC: 116 MG/DL (ref 74–100)
GROUP & RH: NORMAL
INDIRECT COOMBS: NORMAL
POTASSIUM SERPL-SCNC: 3.8 MMOL/L (ref 3.5–5.1)
SODIUM SERPL-SCNC: 142 MMOL/L (ref 136–145)
SPECIMEN OUTDATE: NORMAL

## 2025-05-28 PROCEDURE — 36000707: Performed by: SPECIALIST

## 2025-05-28 PROCEDURE — 03CK0ZZ EXTIRPATION OF MATTER FROM RIGHT INTERNAL CAROTID ARTERY, OPEN APPROACH: ICD-10-PCS | Performed by: SPECIALIST

## 2025-05-28 PROCEDURE — 35301 RECHANNELING OF ARTERY: CPT | Mod: RT,,, | Performed by: SPECIALIST

## 2025-05-28 PROCEDURE — 63600175 PHARM REV CODE 636 W HCPCS: Performed by: ANESTHESIOLOGY

## 2025-05-28 PROCEDURE — 35301 RECHANNELING OF ARTERY: CPT | Mod: AS,RT,, | Performed by: PHYSICIAN ASSISTANT

## 2025-05-28 PROCEDURE — 25000003 PHARM REV CODE 250: Performed by: SPECIALIST

## 2025-05-28 PROCEDURE — 37000008 HC ANESTHESIA 1ST 15 MINUTES: Performed by: SPECIALIST

## 2025-05-28 PROCEDURE — 71000016 HC POSTOP RECOV ADDL HR: Performed by: SPECIALIST

## 2025-05-28 PROCEDURE — C1768 GRAFT, VASCULAR: HCPCS | Performed by: SPECIALIST

## 2025-05-28 PROCEDURE — 63600175 PHARM REV CODE 636 W HCPCS: Performed by: SPECIALIST

## 2025-05-28 PROCEDURE — 36415 COLL VENOUS BLD VENIPUNCTURE: CPT | Performed by: SPECIALIST

## 2025-05-28 PROCEDURE — 80048 BASIC METABOLIC PNL TOTAL CA: CPT | Performed by: SPECIALIST

## 2025-05-28 PROCEDURE — 25000003 PHARM REV CODE 250: Performed by: NURSE ANESTHETIST, CERTIFIED REGISTERED

## 2025-05-28 PROCEDURE — 71000033 HC RECOVERY, INTIAL HOUR: Performed by: SPECIALIST

## 2025-05-28 PROCEDURE — 03UK0KZ SUPPLEMENT RIGHT INTERNAL CAROTID ARTERY WITH NONAUTOLOGOUS TISSUE SUBSTITUTE, OPEN APPROACH: ICD-10-PCS | Performed by: SPECIALIST

## 2025-05-28 PROCEDURE — 21400001 HC TELEMETRY ROOM

## 2025-05-28 PROCEDURE — 25000003 PHARM REV CODE 250: Performed by: ANESTHESIOLOGY

## 2025-05-28 PROCEDURE — 11000001 HC ACUTE MED/SURG PRIVATE ROOM

## 2025-05-28 PROCEDURE — P9047 ALBUMIN (HUMAN), 25%, 50ML: HCPCS | Performed by: ANESTHESIOLOGY

## 2025-05-28 PROCEDURE — 37000009 HC ANESTHESIA EA ADD 15 MINS: Performed by: SPECIALIST

## 2025-05-28 PROCEDURE — 63600175 PHARM REV CODE 636 W HCPCS: Performed by: NURSE ANESTHETIST, CERTIFIED REGISTERED

## 2025-05-28 PROCEDURE — 86850 RBC ANTIBODY SCREEN: CPT | Performed by: SPECIALIST

## 2025-05-28 PROCEDURE — 71000039 HC RECOVERY, EACH ADD'L HOUR: Performed by: SPECIALIST

## 2025-05-28 PROCEDURE — 36000706: Performed by: SPECIALIST

## 2025-05-28 PROCEDURE — 71000015 HC POSTOP RECOV 1ST HR: Performed by: SPECIALIST

## 2025-05-28 PROCEDURE — 25000003 PHARM REV CODE 250: Performed by: PHYSICIAN ASSISTANT

## 2025-05-28 DEVICE — XENOSURE BIOLOGIC PATCH, 0.8CM X 8CM, EIFU
Type: IMPLANTABLE DEVICE | Site: CAROTID | Status: FUNCTIONAL
Brand: XENOSURE BIOLOGIC PATCH

## 2025-05-28 RX ORDER — TALC
6 POWDER (GRAM) TOPICAL NIGHTLY PRN
Status: DISCONTINUED | OUTPATIENT
Start: 2025-05-28 | End: 2025-05-29 | Stop reason: HOSPADM

## 2025-05-28 RX ORDER — ONDANSETRON HYDROCHLORIDE 2 MG/ML
4 INJECTION, SOLUTION INTRAVENOUS EVERY 12 HOURS PRN
Status: DISCONTINUED | OUTPATIENT
Start: 2025-05-28 | End: 2025-05-29 | Stop reason: HOSPADM

## 2025-05-28 RX ORDER — ASPIRIN 81 MG/1
81 TABLET ORAL DAILY
Status: DISCONTINUED | OUTPATIENT
Start: 2025-05-29 | End: 2025-05-29 | Stop reason: HOSPADM

## 2025-05-28 RX ORDER — PANTOPRAZOLE SODIUM 40 MG/1
40 TABLET, DELAYED RELEASE ORAL DAILY
Status: DISCONTINUED | OUTPATIENT
Start: 2025-05-29 | End: 2025-05-29 | Stop reason: HOSPADM

## 2025-05-28 RX ORDER — EPHEDRINE SULFATE 50 MG/ML
10 INJECTION, SOLUTION INTRAVENOUS ONCE
Status: COMPLETED | OUTPATIENT
Start: 2025-05-28 | End: 2025-05-28

## 2025-05-28 RX ORDER — ROCURONIUM BROMIDE 10 MG/ML
INJECTION, SOLUTION INTRAVENOUS
Status: DISCONTINUED | OUTPATIENT
Start: 2025-05-28 | End: 2025-05-28

## 2025-05-28 RX ORDER — DEXAMETHASONE SODIUM PHOSPHATE 4 MG/ML
INJECTION, SOLUTION INTRA-ARTICULAR; INTRALESIONAL; INTRAMUSCULAR; INTRAVENOUS; SOFT TISSUE
Status: DISCONTINUED | OUTPATIENT
Start: 2025-05-28 | End: 2025-05-28

## 2025-05-28 RX ORDER — CEFAZOLIN 2 G/1
2 INJECTION, POWDER, FOR SOLUTION INTRAMUSCULAR; INTRAVENOUS
Status: DISCONTINUED | OUTPATIENT
Start: 2025-05-28 | End: 2025-05-28

## 2025-05-28 RX ORDER — ACETAMINOPHEN 325 MG/1
650 TABLET ORAL EVERY 6 HOURS PRN
Status: DISCONTINUED | OUTPATIENT
Start: 2025-05-28 | End: 2025-05-29 | Stop reason: HOSPADM

## 2025-05-28 RX ORDER — ALBUMIN HUMAN 250 G/1000ML
12.5 SOLUTION INTRAVENOUS ONCE
Status: COMPLETED | OUTPATIENT
Start: 2025-05-28 | End: 2025-05-28

## 2025-05-28 RX ORDER — METOPROLOL TARTRATE 50 MG/1
50 TABLET ORAL DAILY
Status: DISCONTINUED | OUTPATIENT
Start: 2025-05-29 | End: 2025-05-29 | Stop reason: HOSPADM

## 2025-05-28 RX ORDER — ONDANSETRON HYDROCHLORIDE 2 MG/ML
INJECTION, SOLUTION INTRAVENOUS
Status: DISCONTINUED | OUTPATIENT
Start: 2025-05-28 | End: 2025-05-28

## 2025-05-28 RX ORDER — MIDAZOLAM HYDROCHLORIDE 1 MG/ML
INJECTION INTRAMUSCULAR; INTRAVENOUS
Status: DISCONTINUED | OUTPATIENT
Start: 2025-05-28 | End: 2025-05-28

## 2025-05-28 RX ORDER — MORPHINE SULFATE 4 MG/ML
2 INJECTION, SOLUTION INTRAMUSCULAR; INTRAVENOUS EVERY 4 HOURS PRN
Status: DISCONTINUED | OUTPATIENT
Start: 2025-05-28 | End: 2025-05-29 | Stop reason: HOSPADM

## 2025-05-28 RX ORDER — OXYCODONE AND ACETAMINOPHEN 7.5; 325 MG/1; MG/1
1 TABLET ORAL EVERY 6 HOURS PRN
Status: DISCONTINUED | OUTPATIENT
Start: 2025-05-28 | End: 2025-05-29 | Stop reason: HOSPADM

## 2025-05-28 RX ORDER — ACETAMINOPHEN 10 MG/ML
INJECTION, SOLUTION INTRAVENOUS
Status: DISCONTINUED | OUTPATIENT
Start: 2025-05-28 | End: 2025-05-28

## 2025-05-28 RX ORDER — PHENYLEPHRINE HYDROCHLORIDE 10 MG/ML
INJECTION INTRAVENOUS
Status: DISCONTINUED | OUTPATIENT
Start: 2025-05-28 | End: 2025-05-28

## 2025-05-28 RX ORDER — FAMOTIDINE 20 MG/1
40 TABLET, FILM COATED ORAL DAILY
Status: DISCONTINUED | OUTPATIENT
Start: 2025-05-29 | End: 2025-05-29 | Stop reason: HOSPADM

## 2025-05-28 RX ORDER — CEFAZOLIN SODIUM 1 G/3ML
2 INJECTION, POWDER, FOR SOLUTION INTRAMUSCULAR; INTRAVENOUS
Status: DISCONTINUED | OUTPATIENT
Start: 2025-05-28 | End: 2025-05-28

## 2025-05-28 RX ORDER — VASOPRESSIN 20 [USP'U]/ML
INJECTION, SOLUTION INTRAMUSCULAR; SUBCUTANEOUS
Status: DISCONTINUED | OUTPATIENT
Start: 2025-05-28 | End: 2025-05-28

## 2025-05-28 RX ORDER — FENTANYL CITRATE 50 UG/ML
INJECTION, SOLUTION INTRAMUSCULAR; INTRAVENOUS
Status: DISCONTINUED | OUTPATIENT
Start: 2025-05-28 | End: 2025-05-28

## 2025-05-28 RX ORDER — HEPARIN SODIUM 1000 [USP'U]/ML
INJECTION, SOLUTION INTRAVENOUS; SUBCUTANEOUS
Status: DISCONTINUED | OUTPATIENT
Start: 2025-05-28 | End: 2025-05-28

## 2025-05-28 RX ORDER — LEVETIRACETAM 500 MG/1
500 TABLET ORAL 2 TIMES DAILY
Status: DISCONTINUED | OUTPATIENT
Start: 2025-05-28 | End: 2025-05-29 | Stop reason: HOSPADM

## 2025-05-28 RX ORDER — CYCLOBENZAPRINE HCL 10 MG
10 TABLET ORAL 2 TIMES DAILY
Status: DISCONTINUED | OUTPATIENT
Start: 2025-05-28 | End: 2025-05-29 | Stop reason: HOSPADM

## 2025-05-28 RX ORDER — HYDRALAZINE HYDROCHLORIDE 20 MG/ML
10 INJECTION INTRAMUSCULAR; INTRAVENOUS
Status: DISCONTINUED | OUTPATIENT
Start: 2025-05-28 | End: 2025-05-29 | Stop reason: HOSPADM

## 2025-05-28 RX ORDER — FAMOTIDINE 40 MG/1
40 TABLET, FILM COATED ORAL DAILY
COMMUNITY

## 2025-05-28 RX ORDER — MUPIROCIN 20 MG/G
OINTMENT TOPICAL 2 TIMES DAILY
Status: DISCONTINUED | OUTPATIENT
Start: 2025-05-28 | End: 2025-05-29 | Stop reason: HOSPADM

## 2025-05-28 RX ORDER — CEFAZOLIN 2 G/1
2 INJECTION, POWDER, FOR SOLUTION INTRAMUSCULAR; INTRAVENOUS
Status: COMPLETED | OUTPATIENT
Start: 2025-05-28 | End: 2025-05-29

## 2025-05-28 RX ORDER — LIDOCAINE HYDROCHLORIDE 20 MG/ML
INJECTION, SOLUTION EPIDURAL; INFILTRATION; INTRACAUDAL; PERINEURAL
Status: DISCONTINUED | OUTPATIENT
Start: 2025-05-28 | End: 2025-05-28

## 2025-05-28 RX ORDER — CEFAZOLIN SODIUM 1 G/3ML
INJECTION, POWDER, FOR SOLUTION INTRAMUSCULAR; INTRAVENOUS
Status: DISCONTINUED | OUTPATIENT
Start: 2025-05-28 | End: 2025-05-28 | Stop reason: HOSPADM

## 2025-05-28 RX ORDER — ATORVASTATIN CALCIUM 40 MG/1
80 TABLET, FILM COATED ORAL DAILY
Status: DISCONTINUED | OUTPATIENT
Start: 2025-05-29 | End: 2025-05-29 | Stop reason: HOSPADM

## 2025-05-28 RX ORDER — EPHEDRINE SULFATE 50 MG/ML
INJECTION, SOLUTION INTRAVENOUS
Status: DISPENSED
Start: 2025-05-28 | End: 2025-05-29

## 2025-05-28 RX ORDER — CEFAZOLIN SODIUM/WATER 2 G/20 ML
SYRINGE (ML) INTRAVENOUS
Status: DISCONTINUED | OUTPATIENT
Start: 2025-05-28 | End: 2025-05-28

## 2025-05-28 RX ORDER — PROTAMINE SULFATE 10 MG/ML
INJECTION, SOLUTION INTRAVENOUS
Status: DISCONTINUED | OUTPATIENT
Start: 2025-05-28 | End: 2025-05-28

## 2025-05-28 RX ORDER — HEPARIN SOD,PORCINE/0.9 % NACL 1000/500ML
INTRAVENOUS SOLUTION INTRAVENOUS
Status: DISCONTINUED | OUTPATIENT
Start: 2025-05-28 | End: 2025-05-28 | Stop reason: HOSPADM

## 2025-05-28 RX ORDER — SODIUM CHLORIDE 9 MG/ML
INJECTION, SOLUTION INTRAVENOUS CONTINUOUS
Status: DISCONTINUED | OUTPATIENT
Start: 2025-05-28 | End: 2025-05-28

## 2025-05-28 RX ORDER — PROPOFOL 10 MG/ML
VIAL (ML) INTRAVENOUS
Status: DISCONTINUED | OUTPATIENT
Start: 2025-05-28 | End: 2025-05-28

## 2025-05-28 RX ADMIN — PHENYLEPHRINE HYDROCHLORIDE 100 MCG: 10 INJECTION INTRAVENOUS at 03:05

## 2025-05-28 RX ADMIN — PROPOFOL 160 MG: 10 INJECTION, EMULSION INTRAVENOUS at 01:05

## 2025-05-28 RX ADMIN — VASOPRESSIN 1 UNITS: 20 INJECTION INTRAVENOUS at 02:05

## 2025-05-28 RX ADMIN — SODIUM CHLORIDE: 9 INJECTION, SOLUTION INTRAVENOUS at 01:05

## 2025-05-28 RX ADMIN — ACETAMINOPHEN 1000 MG: 10 INJECTION, SOLUTION INTRAVENOUS at 02:05

## 2025-05-28 RX ADMIN — FENTANYL CITRATE 50 MCG: 50 INJECTION, SOLUTION INTRAMUSCULAR; INTRAVENOUS at 02:05

## 2025-05-28 RX ADMIN — ACETAMINOPHEN 650 MG: 325 TABLET ORAL at 09:05

## 2025-05-28 RX ADMIN — PHENYLEPHRINE HYDROCHLORIDE 10 MCG/MIN: 10 INJECTION INTRAVENOUS at 02:05

## 2025-05-28 RX ADMIN — ROCURONIUM BROMIDE 20 MG: 10 SOLUTION INTRAVENOUS at 02:05

## 2025-05-28 RX ADMIN — PROTAMINE SULFATE 20 MG: 10 INJECTION, SOLUTION INTRAVENOUS at 03:05

## 2025-05-28 RX ADMIN — PHENYLEPHRINE HYDROCHLORIDE 100 MCG: 10 INJECTION INTRAVENOUS at 01:05

## 2025-05-28 RX ADMIN — ROCURONIUM BROMIDE 60 MG: 10 SOLUTION INTRAVENOUS at 01:05

## 2025-05-28 RX ADMIN — ONDANSETRON 4 MG: 2 INJECTION INTRAMUSCULAR; INTRAVENOUS at 03:05

## 2025-05-28 RX ADMIN — FENTANYL CITRATE 50 MCG: 50 INJECTION, SOLUTION INTRAMUSCULAR; INTRAVENOUS at 01:05

## 2025-05-28 RX ADMIN — LEVETIRACETAM 500 MG: 500 TABLET, FILM COATED ORAL at 09:05

## 2025-05-28 RX ADMIN — HEPARIN SODIUM 6000 UNITS: 1000 INJECTION, SOLUTION INTRAVENOUS; SUBCUTANEOUS at 02:05

## 2025-05-28 RX ADMIN — EPHEDRINE SULFATE 10 MG: 50 INJECTION INTRAVENOUS at 06:05

## 2025-05-28 RX ADMIN — DEXAMETHASONE SODIUM PHOSPHATE 4 MG: 4 INJECTION, SOLUTION INTRA-ARTICULAR; INTRALESIONAL; INTRAMUSCULAR; INTRAVENOUS; SOFT TISSUE at 02:05

## 2025-05-28 RX ADMIN — LIDOCAINE HYDROCHLORIDE 80 MG: 20 INJECTION, SOLUTION EPIDURAL; INFILTRATION; INTRACAUDAL; PERINEURAL at 01:05

## 2025-05-28 RX ADMIN — VASOPRESSIN 1 UNITS: 20 INJECTION INTRAVENOUS at 01:05

## 2025-05-28 RX ADMIN — EPHEDRINE SULFATE 10 MG: 50 INJECTION INTRAVENOUS at 07:05

## 2025-05-28 RX ADMIN — ALBUMIN (HUMAN) 12.5 G: 12.5 SOLUTION INTRAVENOUS at 07:05

## 2025-05-28 RX ADMIN — SUGAMMADEX 200 MG: 100 INJECTION, SOLUTION INTRAVENOUS at 03:05

## 2025-05-28 RX ADMIN — CYCLOBENZAPRINE 10 MG: 10 TABLET, FILM COATED ORAL at 09:05

## 2025-05-28 RX ADMIN — Medication 2 G: at 01:05

## 2025-05-28 RX ADMIN — MIDAZOLAM HYDROCHLORIDE 2 MG: 1 INJECTION, SOLUTION INTRAMUSCULAR; INTRAVENOUS at 01:05

## 2025-05-28 RX ADMIN — CEFAZOLIN 2 G: 2 INJECTION, POWDER, FOR SOLUTION INTRAMUSCULAR; INTRAVENOUS at 09:05

## 2025-05-28 RX ADMIN — MUPIROCIN: 20 OINTMENT TOPICAL at 09:05

## 2025-05-28 NOTE — ANESTHESIA PREPROCEDURE EVALUATION
05/28/2025  Alycia Lewis is a 54 y.o., female.  ESRD on HD  DM 2  Hyperlipidemia  HTN  SZ D/O  CVA  GIO  GERD      Pre-op Assessment    I have reviewed the Patient Summary Reports.     I have reviewed the Nursing Notes. I have reviewed the NPO Status.   I have reviewed the Medications.     Review of Systems  Anesthesia Hx:  No problems with previous Anesthesia                Social:  Non-Smoker       Cardiovascular:     Hypertension, well controlled   CAD   CABG/stent (1 stent 7/2024 - well since)             Patient on beta blockers Beta blocker taken in last 24 hours                         Pulmonary:         Denies Sleep Apnea.                Renal/:  Chronic Renal Disease (HD 5/26), ESRD, Dialysis                Hepatic/GI:     GERD                Musculoskeletal:         Spine Disorders: lumbar Disc disease           Neurological:   CVA Neuromuscular Disease,   Seizures                                Endocrine:  Diabetes, well controlled, type 2         Obesity / BMI > 30 (Class 1)      Physical Exam  General: Cooperative, Alert and Oriented    Airway:  Mallampati: I   Mouth Opening: Normal  TM Distance: Normal  Neck ROM: Normal ROM    Dental:  Intact    Chest/Lungs:  Clear to auscultation, Normal Respiratory Rate    Heart:  Rate: Normal  Rhythm: Regular Rhythm        Anesthesia Plan  Type of Anesthesia, risks & benefits discussed:    Anesthesia Type: Gen ETT  Intra-op Monitoring Plan: Standard ASA Monitors and Art Line  Post Op Pain Control Plan: multimodal analgesia  Induction:  IV  Airway Plan: Direct, Post-Induction  Informed Consent: Informed consent signed with the Patient and all parties understand the risks and agree with anesthesia plan.  All questions answered. Patient consented to blood products? Yes  ASA Score: 3  Day of Surgery Review of History & Physical: H&P Update referred  to the surgeon/provider.    Ready For Surgery From Anesthesia Perspective.     .

## 2025-05-28 NOTE — ANESTHESIA PROCEDURE NOTES
Arterial    Diagnosis: Carotid stenosis    Patient location during procedure: done in OR  Timeout: 5/28/2025 1:30 PM  Procedure end time: 5/28/2025 1:50 PM    Staffing  Authorizing Provider: Iftikhar Edgar MD  Performing Provider: Iftikhar Edgar MD    Staffing  Performed by: Iftikhar Edgar MD  Authorized by: Iftikhar Edgar MD    Anesthesiologist was present at the time of the procedure.    Preanesthetic Checklist  Completed: patient identified, IV checked, site marked, risks and benefits discussed, surgical consent, monitors and equipment checked, pre-op evaluation, timeout performed and anesthesia consent givenArterial  Skin Prep: chlorhexidine gluconate  Local Infiltration: lidocaine  Orientation: right  Location: brachial    Catheter Size: 18 G  Catheter placement by Ultrasound guidance. Heme positive aspiration all ports.   Vessel Caliber: small, patent, compressibility normal  Needle advanced into vessel with real time Ultrasound guidance.Insertion Attempts: 4 or more  Assessment  Dressing: secured with tape and tegaderm  Patient: Tolerated well

## 2025-05-28 NOTE — TRANSFER OF CARE
"Anesthesia Transfer of Care Note    Patient: Alycia Lewis    Procedure(s) Performed: Procedure(s) (LRB):  ENDARTERECTOMY-CAROTID (Right)    Patient location: PACU    Anesthesia Type: general    Transport from OR: Transported from OR on room air with adequate spontaneous ventilation    Post pain: adequate analgesia    Post assessment: no apparent anesthetic complications and tolerated procedure well    Post vital signs: stable    Level of consciousness: responds to stimulation    Nausea/Vomiting: no nausea/vomiting    Complications: none    Transfer of care protocol was followed      Last vitals: Visit Vitals  BP (!) 130/44   Pulse 76   Temp 36.8 °C (98.2 °F) (Oral)   Resp 18   Ht 5' 7" (1.702 m)   Wt 91.4 kg (201 lb 8 oz)   LMP  (LMP Unknown)   SpO2 98%   Breastfeeding No   BMI 31.56 kg/m²     "

## 2025-05-28 NOTE — ANESTHESIA PROCEDURE NOTES
Intubation    Date/Time: 5/28/2025 1:31 PM    Performed by: Rin Goldman CRNA  Authorized by: Iftikhar Edgar MD    Intubation:     Induction:  Intravenous    Intubated:  Postinduction    Mask Ventilation:  Easy mask    Attempts:  1    Attempted By:  Student (SHA Calvin)    Method of Intubation:  Direct    Blade:  Parks 2    Laryngeal View Grade: Grade IIA - cords partially seen      Difficult Airway Encountered?: No      Complications:  None    Airway Device:  Oral endotracheal tube    Airway Device Size:  7.5    Style/Cuff Inflation:  Cuffed (inflated to minimal occlusive pressure)    Tube secured:  21    Secured at:  The lips    Placement Verified By:  Capnometry    Complicating Factors:  None    Findings Post-Intubation:  BS equal bilateral

## 2025-05-29 VITALS
TEMPERATURE: 99 F | BODY MASS INDEX: 32.76 KG/M2 | OXYGEN SATURATION: 100 % | WEIGHT: 208.75 LBS | RESPIRATION RATE: 25 BRPM | HEIGHT: 67 IN | SYSTOLIC BLOOD PRESSURE: 138 MMHG | HEART RATE: 88 BPM | DIASTOLIC BLOOD PRESSURE: 87 MMHG

## 2025-05-29 LAB
ALBUMIN SERPL-MCNC: 2.4 G/DL (ref 3.5–5)
BASOPHILS # BLD AUTO: 0.03 X10(3)/MCL
BASOPHILS NFR BLD AUTO: 0.4 %
BUN SERPL-MCNC: 39 MG/DL (ref 9.8–20.1)
CALCIUM SERPL-MCNC: 7.5 MG/DL (ref 8.4–10.2)
CHLORIDE SERPL-SCNC: 103 MMOL/L (ref 98–107)
CO2 SERPL-SCNC: 24 MMOL/L (ref 22–29)
CREAT SERPL-MCNC: 3.8 MG/DL (ref 0.55–1.02)
EOSINOPHIL # BLD AUTO: 0.01 X10(3)/MCL (ref 0–0.9)
EOSINOPHIL NFR BLD AUTO: 0.1 %
ERYTHROCYTE [DISTWIDTH] IN BLOOD BY AUTOMATED COUNT: 13.5 % (ref 11.5–17)
GFR SERPLBLD CREATININE-BSD FMLA CKD-EPI: 13 ML/MIN/1.73/M2
GLUCOSE SERPL-MCNC: 191 MG/DL (ref 74–100)
HCT VFR BLD AUTO: 29.3 % (ref 37–47)
HGB BLD-MCNC: 8.6 G/DL (ref 12–16)
IMM GRANULOCYTES # BLD AUTO: 0.03 X10(3)/MCL (ref 0–0.04)
IMM GRANULOCYTES NFR BLD AUTO: 0.4 %
LYMPHOCYTES # BLD AUTO: 1.72 X10(3)/MCL (ref 0.6–4.6)
LYMPHOCYTES NFR BLD AUTO: 22.7 %
MCH RBC QN AUTO: 29.7 PG (ref 27–31)
MCHC RBC AUTO-ENTMCNC: 29.4 G/DL (ref 33–36)
MCV RBC AUTO: 101 FL (ref 80–94)
MONOCYTES # BLD AUTO: 0.57 X10(3)/MCL (ref 0.1–1.3)
MONOCYTES NFR BLD AUTO: 7.5 %
NEUTROPHILS # BLD AUTO: 5.23 X10(3)/MCL (ref 2.1–9.2)
NEUTROPHILS NFR BLD AUTO: 68.9 %
NRBC BLD AUTO-RTO: 0 %
PHOSPHATE SERPL-MCNC: 4.5 MG/DL (ref 2.3–4.7)
PLATELET # BLD AUTO: 182 X10(3)/MCL (ref 130–400)
PMV BLD AUTO: 10.4 FL (ref 7.4–10.4)
POCT GLUCOSE: 125 MG/DL (ref 70–110)
POCT GLUCOSE: 166 MG/DL (ref 70–110)
POTASSIUM SERPL-SCNC: 4.3 MMOL/L (ref 3.5–5.1)
RBC # BLD AUTO: 2.9 X10(6)/MCL (ref 4.2–5.4)
SODIUM SERPL-SCNC: 143 MMOL/L (ref 136–145)
WBC # BLD AUTO: 7.59 X10(3)/MCL (ref 4.5–11.5)

## 2025-05-29 PROCEDURE — 94760 N-INVAS EAR/PLS OXIMETRY 1: CPT

## 2025-05-29 PROCEDURE — 25000003 PHARM REV CODE 250: Performed by: PHYSICIAN ASSISTANT

## 2025-05-29 PROCEDURE — 63600175 PHARM REV CODE 636 W HCPCS: Performed by: SPECIALIST

## 2025-05-29 PROCEDURE — 99900031 HC PATIENT EDUCATION (STAT)

## 2025-05-29 PROCEDURE — 99900035 HC TECH TIME PER 15 MIN (STAT)

## 2025-05-29 PROCEDURE — 85025 COMPLETE CBC W/AUTO DIFF WBC: CPT | Performed by: STUDENT IN AN ORGANIZED HEALTH CARE EDUCATION/TRAINING PROGRAM

## 2025-05-29 PROCEDURE — 36415 COLL VENOUS BLD VENIPUNCTURE: CPT | Performed by: STUDENT IN AN ORGANIZED HEALTH CARE EDUCATION/TRAINING PROGRAM

## 2025-05-29 PROCEDURE — 25000003 PHARM REV CODE 250: Performed by: SPECIALIST

## 2025-05-29 PROCEDURE — 80069 RENAL FUNCTION PANEL: CPT | Performed by: STUDENT IN AN ORGANIZED HEALTH CARE EDUCATION/TRAINING PROGRAM

## 2025-05-29 RX ADMIN — PANTOPRAZOLE SODIUM 40 MG: 40 TABLET, DELAYED RELEASE ORAL at 08:05

## 2025-05-29 RX ADMIN — ACETAMINOPHEN 650 MG: 325 TABLET ORAL at 03:05

## 2025-05-29 RX ADMIN — ASPIRIN 81 MG: 81 TABLET, DELAYED RELEASE ORAL at 08:05

## 2025-05-29 RX ADMIN — CYCLOBENZAPRINE 10 MG: 10 TABLET, FILM COATED ORAL at 08:05

## 2025-05-29 RX ADMIN — MUPIROCIN: 20 OINTMENT TOPICAL at 08:05

## 2025-05-29 RX ADMIN — ATORVASTATIN CALCIUM 80 MG: 40 TABLET, FILM COATED ORAL at 08:05

## 2025-05-29 RX ADMIN — CEFAZOLIN 2 G: 2 INJECTION, POWDER, FOR SOLUTION INTRAMUSCULAR; INTRAVENOUS at 05:05

## 2025-05-29 RX ADMIN — FAMOTIDINE 40 MG: 20 TABLET, FILM COATED ORAL at 08:05

## 2025-05-29 RX ADMIN — LEVETIRACETAM 500 MG: 500 TABLET, FILM COATED ORAL at 08:05

## 2025-05-29 NOTE — ANESTHESIA POSTPROCEDURE EVALUATION
Anesthesia Post Evaluation    Patient: Alycia Lewis    Procedure(s) Performed: Procedure(s) (LRB):  ENDARTERECTOMY-CAROTID (Right)    Final Anesthesia Type: general      Patient location during evaluation: PACU  Patient participation: Yes- Able to Participate  Level of consciousness: awake and alert and oriented  Post-procedure vital signs: reviewed and stable  Pain management: adequate  Airway patency: patent    PONV status at discharge: No PONV  Anesthetic complications: no      Cardiovascular status: hemodynamically stable  Respiratory status: unassisted  Hydration status: euvolemic  Follow-up not needed.              Vitals Value Taken Time   BP 93/41 05/28/25 19:30   Temp 36.6 °C (97.8 °F) 05/28/25 15:53   Pulse 80 05/28/25 19:36   Resp 17 05/28/25 19:36   SpO2 100 % 05/28/25 19:36   Vitals shown include unfiled device data.      No case tracking events are documented in the log.      Pain/Barbara Score: Barbara Score: 10 (5/28/2025  7:20 PM)

## 2025-05-30 ENCOUNTER — PATIENT OUTREACH (OUTPATIENT)
Dept: ADMINISTRATIVE | Facility: CLINIC | Age: 55
End: 2025-05-30
Payer: MEDICARE

## 2025-05-30 NOTE — PROGRESS NOTES
C3 nurse spoke with Alycia Lewis  for a TCC post hospital discharge follow up call. The patient has a scheduled HOSFU appointment with Marciano Briceño MD (Family Medicine) on 6/6/2025; @11 AM

## 2025-06-02 ENCOUNTER — TELEPHONE (OUTPATIENT)
Dept: VASCULAR SURGERY | Facility: CLINIC | Age: 55
End: 2025-06-02
Payer: MEDICARE

## 2025-06-02 NOTE — TELEPHONE ENCOUNTER
Alycia Lewis is s/p right carotid endarterectomy on 5/28/2025. She was called regarding her post-operative status. I spoke to her  who states they will call us back as they are currently in the ED at [facility]. He states he doesn't think the current issue has anything to do with her surgery but will make them aware so they can take a look a her neck. He states she is having some pain. Encouraged them to keep us updated on her continued concerns.

## 2025-06-03 LAB — PSYCHE PATHOLOGY RESULT: NORMAL

## 2025-07-17 NOTE — PROGRESS NOTES
"    [facility] - Clinic Note  Heri Hester MD      Patient Name: Alycia Lewis                   : 1970      MRN: [id number]   Visit Date: 2025       History Present Illness     Reason for Visit: Carotid Artery Disease and Post-operative Follow up     Ms. Lewis presents to the clinic for 6 week post operative follow up with carotid duplex. She is s/p right carotid endarterectomy 25. She denies signs or symptoms of stroke or TIA.     She reports episodes of dizziness yesterday. She suspects it is because she is dehydrated. She also states she is fatigued the day after dialysis. She fell out of her wheelchair recently. She has a hematoma to her left leg that got infected. She had a debridement and has healed now. She is scheduled to follow up on Thursday.    She is on hemodialysis using a left arm graft without issues. Hand symptoms remain stable.       REVIEW OF SYSTEMS:  Review of systems conducted, negative except as stated in the history of present illness. See HPI for details.        Physical Exam      Vitals:    25 1022   BP: (!) 65/36   BP Location: Right arm   Patient Position: Sitting   Pulse: 62   Weight: 94.3 kg (208 lb)   Height: 5' 7" (1.702 m)          General: well-nourished, no acute distress, and healthy appearing, in a wheelchair, alert, pleasant, conversant, and oriented  Neurologic: cranial nerves are grossly intact marginal mandibular of the facial nerve and hypoglossal nerve demonstrates appropriate function, no neurologic deficits, no motor deficits, and no sensory deficits  HEENT: grossly normal and no scleral icterus  Neck/Chest: normal , soft without lymphadenopathy, and right neck incision is present  Respiratory: breathing easily and without respiratory distress  Abdomen: normal and soft  Cardiology: regular rate and rhythm    Upper Extremity Arterial Exam:   Right - radial is palpable  Left - radial is palpable    Musculoskeletal: " "  Upper Extremity: normal bilateral hand function  Lower extremity: bandage in place to left lower leg    Post Operative Incision:   Location: right neck  clean, dry, and healing appropriately             Assessment and Plan     Ms. Lewis is a 54 y.o. is s/p right carotid endarterectomy on 5/28/2025 for stroke risk reduction. She has done well during post operative recovery with a well-healed right neck incision. Carotid duplex obtained today demonstrates RIGHT carotid with normal post operative changes and a peak systolic velocity of 101 cm/s. LEFT carotid demonstrates mild stenosis with a peak systolic velocity of 107 cm/s. Instructed to continue Aspirin and Plavix (clopidogrel) and statin therapy. Recommend follow up in 6 month follow up with repeat duplex.    1. Bilateral carotid artery stenosis  - CV Ultrasound Bilateral Doppler Carotid; Future    2. S/P carotid endarterectomy          Medical History     Past Medical History:   Diagnosis Date    Anemia     Carotid artery stenosis     Coronary artery disease     CVA (cerebral vascular accident) 08/2024    Diabetes mellitus, type 2     Disorder of kidney and ureter     Dysphagia     ESRD (end stage renal disease) on dialysis     MWF    General anesthetics causing adverse effect in therapeutic use     "I do not wake up, have had to be re-intibated"    GERD (gastroesophageal reflux disease)     Glaucoma     Gout     History of heart murmur in childhood     Hyperlipidemia     Hypertension     not on medications since dialysis    Insomnia     OAB (overactive bladder)     Obesity     PVD (peripheral vascular disease)     Renal disorder     Seizures     last seizure 08/2024    Sleep apnea, unspecified     does not use CPAP    Steal syndrome of dialysis vascular access     Stenosis of right carotid artery     Thyroid nodule     Transfusion reaction     Uses walker     Uses wheelchair      Past Surgical History:   Procedure Laterality Date    APPENDECTOMY      " "CAROTID ENDARTERECTOMY Right 5/28/2025    Procedure: ENDARTERECTOMY-CAROTID;  Surgeon: Heri Hester MD;  Location: [facility] OR;  Service: Peripheral Vascular;  Laterality: Right;  skytron table    CATARACT EXTRACTION Bilateral     CHOLECYSTECTOMY      COLONOSCOPY      CORONARY STENT PLACEMENT      ESOPHAGOGASTRODUODENOSCOPY      GLAUCOMA SURGERY Bilateral     HEEL SPUR EXCISION Left     HYSTERECTOMY      KNEE ARTHROSCOPY W/ MENISCAL REPAIR Left     OPEN REDUCTION AND INTERNAL FIXATION (ORIF) OF FRACTURE OF LOWER LEG Left     OPEN REDUCTION AND INTERNAL FIXATION (ORIF) OF INJURY OF ANKLE Right     PLACEMENT OF ARTERIOVENOUS GRAFT      [place]ank    PLACEMENT OF ARTERIOVENOUS GRAFT Left 07/23/2024    RADIOFREQUENCY ABLATION, NERVE, SPINAL, LUMBOSACRAL      multiple    ROTATOR CUFF REPAIR Bilateral     L x1 with bicep repair; R x2    SPINE SURGERY      THROMBECTOMY Left 09/29/2023    Procedure: THROMBECTOMY;  Surgeon: Cristiano Estevez DO;  Location: [facility] CATH LAB;  Service: Peripheral Vascular;  Laterality: Left;  forearm graft    THYROIDECTOMY, PARTIAL      TRIGGER FINGER RELEASE Left     thumb     Family History   Problem Relation Name Age of Onset    Kidney disease Mother      Alzheimer's disease Mother      Heart disease Father      Hyperlipidemia Brother      Hypertension Brother      Diabetes Brother       Social History[1]  Current Outpatient Medications   Medication Instructions    aspirin 81 mg Cap 1 tablet, Daily    BD INSULIN SYRINGE ULTRA-FINE 0.5 mL 31 gauge x 5/16" Syrg 1 INSULIN SYRINGE AS NEEDED SUBCUTANEOUS THREE TIMES A DAY 30 DAYS    BD ULTRA-FINE MINI PEN NEEDLE 31 gauge x 3/16" Ndle USE AS DIRECTED 4 TIMES A DAY    cyclobenzaprine (FLEXERIL) 10 mg, 2 times daily    diazePAM (VALIUM) 5 MG tablet     diclofenac sodium (VOLTAREN) 1 % Gel 4 times daily PRN    famotidine (PEPCID) 40 mg, Daily    insulin glargine U-100 (Lantus) (LANTUS U-100 INSULIN) 10 Units, Nightly    insulin lispro 100 unit/mL " injection 3 times daily before meals    levETIRAcetam (KEPPRA) 500 mg, Oral, 2 times daily, Give postdialysis on dialysis days    linaCLOtide (LINZESS) 145 mcg, Daily PRN    metoclopramide HCl (REGLAN) 10 mg, 3 times daily    metoprolol tartrate (LOPRESSOR) 50 mg, Daily    nitrofurantoin, macrocrystal-monohydrate, (MACROBID) 100 MG capsule 100 mg, 2 times daily    oxybutynin (DITROPAN-XL) 5 mg, Nightly    oxyCODONE (ROXICODONE) 10 mg, Every 8 hours PRN    oxyCODONE-acetaminophen (PERCOCET) 7.5-325 mg per tablet 1 tablet, Every 6 hours PRN    pantoprazole (PROTONIX) 40 mg, Daily    PLAVIX 75 mg tablet 1 tablet, Daily    rosuvastatin (CRESTOR) 40 mg, Nightly    semaglutide (OZEMPIC SUBQ) 1 mg, Weekly    TRUE METRIX GLUCOSE TEST STRIP Strp AS DIRECTED IN VITRO THREE TIMES A DAY 90 DAYS    TRUEPLUS LANCETS 33 gauge Misc 3 times daily     Review of patient's allergies indicates:   Allergen Reactions    Adhesive tape-silicones Rash    Hydrocodone-acetaminophen Nausea And Vomiting and Rash    Adhesive Rash    Latex, natural rubber      Steri-strips, silk tape,    Nsaids (non-steroidal anti-inflammatory drug)      Other reaction(s): kidney issues    Opioids - morphine analogues Other (See Comments)     Altered mental status    Toradol [ketorolac] Other (See Comments)     Pt with h/o ESRD      Dulaglutide Nausea And Vomiting       Patient Care Team:  Marciano Briceño MD as PCP - General (Family Medicine)  Rafi Machado MD as Consulting Physician (Nephrology)  Gracie Redd (Dialysis Center)  Braulio Dumont MD as Consulting Physician (General Surgery)  Jasvir Ascencio MD as Consulting Physician (Cardiology)  Heri Hester MD as Vascular Surgery (Vascular Surgery)        No follow-ups on file. In addition to their scheduled follow up, the patient has also been instructed to follow up on as needed basis.     No future appointments.              [1]   Social History  Socioeconomic History    Marital status:       Spouse name: Sadiq Lewis   Tobacco Use    Smoking status: Never    Smokeless tobacco: Never   Vaping Use    Vaping status: Never Used   Substance and Sexual Activity    Alcohol use: Not Currently    Drug use: Never    Sexual activity: Not Currently   Social History Narrative    Caregiver      Social Drivers of Health     Financial Resource Strain: Patient Declined (5/28/2025)    Overall Financial Resource Strain (CARDIA)     Difficulty of Paying Living Expenses: Patient declined   Food Insecurity: No Food Insecurity (5/29/2025)    Hunger Vital Sign     Worried About Running Out of Food in the Last Year: Never true     Ran Out of Food in the Last Year: Never true   Transportation Needs: No Transportation Needs (5/29/2025)    PRAPARE - Transportation     Lack of Transportation (Medical): No     Lack of Transportation (Non-Medical): No   Stress: Patient Declined (5/28/2025)    [place] [place] [employer] - Occupational Stress Questionnaire     Feeling of Stress : Patient declined   Housing Stability: Low Risk  (5/29/2025)    Housing Stability Vital Sign     Unable to Pay for Housing in the Last Year: No     Homeless in the Last Year: No

## 2025-07-22 ENCOUNTER — OFFICE VISIT (OUTPATIENT)
Dept: VASCULAR SURGERY | Facility: CLINIC | Age: 55
End: 2025-07-22
Attending: SPECIALIST
Payer: MEDICARE

## 2025-07-22 VITALS
WEIGHT: 208 LBS | DIASTOLIC BLOOD PRESSURE: 36 MMHG | HEIGHT: 67 IN | HEART RATE: 62 BPM | BODY MASS INDEX: 32.65 KG/M2 | SYSTOLIC BLOOD PRESSURE: 65 MMHG

## 2025-07-22 DIAGNOSIS — I65.23 BILATERAL CAROTID ARTERY STENOSIS: Primary | ICD-10-CM

## 2025-07-22 DIAGNOSIS — Z98.890 S/P CAROTID ENDARTERECTOMY: ICD-10-CM

## 2025-07-22 PROCEDURE — 1159F MED LIST DOCD IN RCRD: CPT | Mod: CPTII,,, | Performed by: SPECIALIST

## 2025-07-22 PROCEDURE — 1160F RVW MEDS BY RX/DR IN RCRD: CPT | Mod: CPTII,,, | Performed by: SPECIALIST

## 2025-07-22 PROCEDURE — 3078F DIAST BP <80 MM HG: CPT | Mod: CPTII,,, | Performed by: SPECIALIST

## 2025-07-22 PROCEDURE — 3074F SYST BP LT 130 MM HG: CPT | Mod: CPTII,,, | Performed by: SPECIALIST

## 2025-07-22 PROCEDURE — 99024 POSTOP FOLLOW-UP VISIT: CPT | Mod: ,,, | Performed by: SPECIALIST

## 2025-08-07 ENCOUNTER — HOSPITAL ENCOUNTER (OUTPATIENT)
Dept: TELEMEDICINE | Facility: HOSPITAL | Age: 55
Discharge: HOME | End: 2025-08-07
Payer: MEDICARE

## 2025-08-07 DIAGNOSIS — R53.1 ACUTE LEFT-SIDED WEAKNESS: Primary | ICD-10-CM

## 2025-08-07 PROCEDURE — G0425 INPT/ED TELECONSULT30: HCPCS | Mod: 95,,, | Performed by: PSYCHIATRY & NEUROLOGY

## (undated) DEVICE — NDL PERC ENTRY BSDN 18-7.0

## (undated) DEVICE — PRESTO INFLATION DEVICE

## (undated) DEVICE — SHEATH PINNACLE 7FR HIFLO

## (undated) DEVICE — BAG MEDI-PLAST DECANTER C-FLOW

## (undated) DEVICE — SUT 4-0 12-18IN SILK BLACK

## (undated) DEVICE — LOOP STERION MINI RED 8X406MM

## (undated) DEVICE — DRAPE INCISE IOBAN 2 13X13IN

## (undated) DEVICE — SOL NORMAL USPCA 0.9%

## (undated) DEVICE — LOOP STERION MAXI YEL 1X406MM

## (undated) DEVICE — CATH EMB FOGARTY 5.5FRX40CM

## (undated) DEVICE — Device

## (undated) DEVICE — CLIP HORIZON LIG TI MED-LG

## (undated) DEVICE — CATH PTA BLLN PORT 5X40MM

## (undated) DEVICE — SUT MCRYL PLUS 4-0 PS2 27IN

## (undated) DEVICE — GLOVE PROTEXIS HYDROGEL SZ6.5

## (undated) DEVICE — GUIDEWIRE STD .035X180CM ANG

## (undated) DEVICE — WIPE MERCL POLYVIL ACETL 3X3IN

## (undated) DEVICE — SUT MONOCRYL 3-0 PS-2 UND

## (undated) DEVICE — BOWL STERILE LARGE 32OZ

## (undated) DEVICE — SUT 7/0 24IN PROLENE BL MO

## (undated) DEVICE — SUT PROLENE 6-0 BV-1 30IN

## (undated) DEVICE — NDL 27G X 1 1/4

## (undated) DEVICE — COVER PROBE US 5.5X58L NON LTX

## (undated) DEVICE — KIT SURGICAL TURNOVER

## (undated) DEVICE — PROBE DOPPLER VTI DISP 8MHZ

## (undated) DEVICE — BLLN CHAMELEON 7X40X75CM

## (undated) DEVICE — SUT VICRYL 3-0 27 SH

## (undated) DEVICE — SUT 2-0 12-18IN SILK

## (undated) DEVICE — SUT 3-0 12-18IN SILK

## (undated) DEVICE — ELECTRODE PATIENT RETURN DISP

## (undated) DEVICE — NDL HEPARIN FLUSHING

## (undated) DEVICE — TUBE SUCTION MEDI-VAC STERILE

## (undated) DEVICE — DRAPE UTILITY W/ TAPE 20X30IN

## (undated) DEVICE — SHEATH BRITE TIP 7FR 5.5CM

## (undated) DEVICE — CANNULA NASAL ADLT EAR 25FT

## (undated) DEVICE — ADHESIVE DERMABOND ADVANCED

## (undated) DEVICE — DRESSING TRANS 4X4 TEGADERM